# Patient Record
Sex: FEMALE | Race: BLACK OR AFRICAN AMERICAN | NOT HISPANIC OR LATINO | Employment: OTHER | URBAN - METROPOLITAN AREA
[De-identification: names, ages, dates, MRNs, and addresses within clinical notes are randomized per-mention and may not be internally consistent; named-entity substitution may affect disease eponyms.]

---

## 2017-02-01 ENCOUNTER — ALLSCRIPTS OFFICE VISIT (OUTPATIENT)
Dept: OTHER | Facility: OTHER | Age: 67
End: 2017-02-01

## 2017-06-12 ENCOUNTER — ALLSCRIPTS OFFICE VISIT (OUTPATIENT)
Dept: OTHER | Facility: OTHER | Age: 67
End: 2017-06-12

## 2017-06-12 DIAGNOSIS — R07.89 OTHER CHEST PAIN: ICD-10-CM

## 2017-06-12 DIAGNOSIS — I10 ESSENTIAL (PRIMARY) HYPERTENSION: ICD-10-CM

## 2017-06-12 DIAGNOSIS — E55.9 VITAMIN D DEFICIENCY: ICD-10-CM

## 2017-06-21 ENCOUNTER — GENERIC CONVERSION - ENCOUNTER (OUTPATIENT)
Dept: OTHER | Facility: OTHER | Age: 67
End: 2017-06-21

## 2017-06-21 ENCOUNTER — APPOINTMENT (OUTPATIENT)
Dept: LAB | Facility: CLINIC | Age: 67
End: 2017-06-21
Payer: MEDICARE

## 2017-06-21 PROCEDURE — 80061 LIPID PANEL: CPT

## 2017-06-21 PROCEDURE — 36415 COLL VENOUS BLD VENIPUNCTURE: CPT

## 2017-06-21 PROCEDURE — 84443 ASSAY THYROID STIM HORMONE: CPT

## 2017-06-21 PROCEDURE — 85025 COMPLETE CBC W/AUTO DIFF WBC: CPT

## 2017-06-21 PROCEDURE — 82306 VITAMIN D 25 HYDROXY: CPT

## 2017-06-21 PROCEDURE — 80053 COMPREHEN METABOLIC PANEL: CPT

## 2017-07-06 ENCOUNTER — HOSPITAL ENCOUNTER (OUTPATIENT)
Dept: NON INVASIVE DIAGNOSTICS | Facility: CLINIC | Age: 67
Discharge: HOME | End: 2017-07-06
Payer: MEDICARE

## 2017-07-06 PROCEDURE — A9502 TC99M TETROFOSMIN: HCPCS

## 2017-07-06 PROCEDURE — 93017 CV STRESS TEST TRACING ONLY: CPT

## 2017-07-06 PROCEDURE — 78452 HT MUSCLE IMAGE SPECT MULT: CPT

## 2017-07-06 RX ADMIN — REGADENOSON 0.4 MG: 0.08 INJECTION, SOLUTION INTRAVENOUS at 14:20

## 2017-07-07 LAB
ARRHY DURING EX: NORMAL
CHEST PAIN STATEMENT: NORMAL
MAX DIASTOLIC BP: 78 MMHG
MAX HEART RATE: 83 BPM
MAX PREDICTED HEART RATE: 152 BPM
MAX. SYSTOLIC BP: 138 MMHG
PROTOCOL NAME: NORMAL
REASON FOR TERMINATION: NORMAL
TARGET HR FORMULA: NORMAL
TEST INDICATION: NORMAL
TIME IN EXERCISE PHASE: 180 S

## 2017-07-25 ENCOUNTER — ALLSCRIPTS OFFICE VISIT (OUTPATIENT)
Dept: OTHER | Facility: OTHER | Age: 67
End: 2017-07-25

## 2017-07-26 ENCOUNTER — GENERIC CONVERSION - ENCOUNTER (OUTPATIENT)
Dept: OTHER | Facility: OTHER | Age: 67
End: 2017-07-26

## 2017-07-27 RX ORDER — MAG HYDROX/ALUMINUM HYD/SIMETH 400-400-40
SUSPENSION, ORAL (FINAL DOSE FORM) ORAL
COMMUNITY

## 2017-07-27 RX ORDER — LOSARTAN POTASSIUM AND HYDROCHLOROTHIAZIDE 25; 100 MG/1; MG/1
1 TABLET ORAL DAILY
COMMUNITY
End: 2018-10-30 | Stop reason: SDUPTHER

## 2017-07-31 ENCOUNTER — ANESTHESIA EVENT (OUTPATIENT)
Dept: PERIOP | Facility: HOSPITAL | Age: 67
End: 2017-07-31
Payer: MEDICARE

## 2017-08-01 ENCOUNTER — HOSPITAL ENCOUNTER (OUTPATIENT)
Facility: HOSPITAL | Age: 67
Setting detail: OUTPATIENT SURGERY
Discharge: HOME | End: 2017-08-01
Attending: INTERNAL MEDICINE | Admitting: INTERNAL MEDICINE
Payer: MEDICARE

## 2017-08-01 ENCOUNTER — ANESTHESIA (OUTPATIENT)
Dept: PERIOP | Facility: HOSPITAL | Age: 67
End: 2017-08-01
Payer: MEDICARE

## 2017-08-01 VITALS
TEMPERATURE: 97.8 F | OXYGEN SATURATION: 98 % | DIASTOLIC BLOOD PRESSURE: 61 MMHG | SYSTOLIC BLOOD PRESSURE: 128 MMHG | HEART RATE: 56 BPM | RESPIRATION RATE: 20 BRPM | BODY MASS INDEX: 44.34 KG/M2 | WEIGHT: 250.25 LBS | HEIGHT: 63 IN

## 2017-08-01 RX ORDER — SODIUM CHLORIDE, SODIUM LACTATE, POTASSIUM CHLORIDE, CALCIUM CHLORIDE 600; 310; 30; 20 MG/100ML; MG/100ML; MG/100ML; MG/100ML
125 INJECTION, SOLUTION INTRAVENOUS CONTINUOUS
Status: DISCONTINUED | OUTPATIENT
Start: 2017-08-01 | End: 2017-08-01 | Stop reason: HOSPADM

## 2017-08-01 RX ORDER — PROPOFOL 10 MG/ML
INJECTION, EMULSION INTRAVENOUS AS NEEDED
Status: DISCONTINUED | OUTPATIENT
Start: 2017-08-01 | End: 2017-08-01 | Stop reason: SURG

## 2017-08-01 RX ADMIN — PROPOFOL 30 MG: 10 INJECTION, EMULSION INTRAVENOUS at 09:37

## 2017-08-01 RX ADMIN — SODIUM CHLORIDE, SODIUM LACTATE, POTASSIUM CHLORIDE, AND CALCIUM CHLORIDE: .6; .31; .03; .02 INJECTION, SOLUTION INTRAVENOUS at 09:13

## 2017-08-01 RX ADMIN — PROPOFOL 90 MG: 10 INJECTION, EMULSION INTRAVENOUS at 09:31

## 2017-08-01 RX ADMIN — PROPOFOL 30 MG: 10 INJECTION, EMULSION INTRAVENOUS at 09:33

## 2017-08-01 RX ADMIN — PROPOFOL 20 MG: 10 INJECTION, EMULSION INTRAVENOUS at 09:32

## 2017-08-01 RX ADMIN — PROPOFOL 30 MG: 10 INJECTION, EMULSION INTRAVENOUS at 09:34

## 2017-10-17 ENCOUNTER — ALLSCRIPTS OFFICE VISIT (OUTPATIENT)
Dept: OTHER | Facility: OTHER | Age: 67
End: 2017-10-17

## 2017-10-18 NOTE — PROGRESS NOTES
Assessment  1  Hypertension (401 9) (I10)  2  Vitamin D deficiency (268 9) (E55 9)  3  Obesity (278 00) (E66 9)  4  Knee pain, left (739 46) (Q52 562)    Plan  Hypertension    · (1) CBC/PLT/DIFF; Status:Active; Requested for:27Gqb0253;    · (1) COMPREHENSIVE METABOLIC PANEL; Status:Active; Requested for:67Xca3217;    · (1) LIPID PANEL, FASTING; Status:Active; Requested for:14Kty6331;    · (1) TSH; Status:Active; Requested for:50Jvl5708;   Obesity    · There are many exercise options for seniors ; Status:Complete;   Done: 44AYC2315  PMH: Accelerated essential hypertension    · Renew: Losartan Potassium-HCTZ 100-25 MG Oral Tablet; TAKE 1 TABLET ONCE DAILY  Vitamin D deficiency    · (1) VITAMIN D 25-HYDROXY; Status:Active; Requested for:56Ler9919;     Discussion/Summary  Discussion Summary:   Hypertension- the blood pressure is controlled on present medications, continue the same  She was told to cut down on the sodium intakeD deficiency- she will continue to take 5000 units daily  patient was told to increase her activitywill see her back in 4 months  2010  Counseling Documentation With Imm: The patient was counseled regarding diagnostic results,-- instructions for management,-- risk factor reductions,-- risks and benefits of treatment options,-- importance of compliance with treatment  Medication SE Review and Pt Understands Tx: Possible side effects of new medications were reviewed with the patient/guardian today  The treatment plan was reviewed with the patient/guardian  The patient/guardian understands and agrees with the treatment plan      Chief Complaint  Chief Complaint Chronic Condition [facility]: Patient is here today for follow up of chronic conditions described in HPI  History of Present Illness  Obesity (Follow-Up): The patient is being seen for follow-up of obesity  The patient reports no change in the condition  She has had no significant interval events  The patient is currently asymptomatic   The patient is not currently on medication for this problem  Patellofemoral Problem (Brief): The patient is being seen for worsening symptoms of patellofemoral pain  This condition involves the left knee  Symptoms:  knee pain,-- knee swelling-- and-- stiffness in the knee  No associated symptoms are reported  Current treatment includes non-opioid analgesics  Vitamin D Deficiency: The patient is being seen for follow-up of vitamin D deficiency  Disease type: vitamin D insufficiency  Current treatment includes vitamin D3 (cholecalciferol)  The patient is currently asymptomatic  Hypertension (Follow-Up): The patient presents for follow-up of essential hypertension  The patient states she has been stable with her blood pressure control since the last visit  She has no comorbid illnesses  She has no significant interval events  Symptoms: The patient is currently asymptomatic  Medications: the patient is adherent with her medication regimen  -- the patient complains of medication side effects  Review of Systems  Complete-Female:   Constitutional: No fever, no chills, feels well, no tiredness, no recent weight gain or weight loss  Eyes: No complaints of eye pain, no red eyes, no eyesight problems, no discharge, no dry eyes, no itching of eyes  ENT: no complaints of earache, no loss of hearing, no nose bleeds, no nasal discharge, no sore throat, no hoarseness  Cardiovascular: No complaints of slow heart rate, no fast heart rate, no chest pain, no palpitations, no leg claudication, no lower extremity edema  Respiratory: No complaints of shortness of breath, no wheezing, no cough, no SOB on exertion, no orthopnea, no PND  Gastrointestinal: No complaints of abdominal pain, no constipation, no nausea or vomiting, no diarrhea, no bloody stools  Genitourinary: No complaints of dysuria, no incontinence, no pelvic pain, no dysmenorrhea, no vaginal discharge or bleeding  Musculoskeletal: as noted in HPI  Integumentary: No complaints of skin rash or lesions, no itching, no skin wounds, no breast pain or lump  Neurological: No complaints of headache, no confusion, no convulsions, no numbness, no dizziness or fainting, no tingling, no limb weakness, no difficulty walking  Psychiatric: Not suicidal, no sleep disturbance, no anxiety or depression, no change in personality, no emotional problems  Endocrine: No complaints of proptosis, no hot flashes, no muscle weakness, no deepening of the voice, no feelings of weakness  Hematologic/Lymphatic: No complaints of swollen glands, no swollen glands in the neck, does not bleed easily, does not bruise easily  Active Problems  1  Abnormal mammogram (793 80) (R92 8)  2  History of colon polyps (V12 72) (Z86 010)  3  Hypertension (401 9) (I10)  4  No advance directives (V49 89) (Z78 9)  5  Obesity (278 00) (E66 9)  6  Screening for colon cancer (V76 51) (Z12 11)  7  Screening for genitourinary condition (V81 6) (Z13 89)  8  Screening for skin condition (V82 0) (Z13 89)  9  Seborrheic keratosis (702 19) (L82 1)  10  Vitamin D deficiency (268 9) (E55 9)    Past Medical History  1  History of Abnormal blood chemistry (790 6) (R79 9)  2  History of Accelerated essential hypertension (401 0) (I10)  3  History of Allergic rhinitis (477 9) (J30 9)  4  History of Chest pressure (786 59) (R07 89)  5  History of Encounter for screening mammogram for breast cancer (V76 12) (Z12 31)  6  History of edema (V13 89) (Z87 898)  7  History of hypertension (V12 59) (Z86 79)  8  History of pneumococcal vaccination (V49 89) (Z92 29)  9  History of Joint pain, knee (719 46) (M25 569)  10  History of Myalgia And Myositis (729 1)  11  History of Need for influenza vaccination (V04 81) (Z23)  Active Problems And Past Medical History Reviewed: The active problems and past medical history were reviewed and updated today  Surgical History  1   Denied: History of Recent Surgery  Surgical History Reviewed: The surgical history was reviewed and updated today  Family History  Mother   1  No pertinent family history  Family History   2  Family history of Diabetes Mellitus (V18 0)  3  Family history of Hypertension (V17 49)  4  No pertinent family history  Family History Reviewed: The family history was reviewed and updated today  Social History   · Former smoker (N71 18) (D87 713)   · History of Former smoker (K02 12) (S36 041)   · Marital History -    · No advance directives (V49 89) (Z78 9)  Social History Reviewed: The social history was reviewed and updated today  The social history was reviewed and is unchanged  Current Meds  1  Losartan Potassium-HCTZ 100-25 MG Oral Tablet; TAKE 1 TABLET ONCE DAILY; Therapy: 72LAL1949 to (Last Rx:19Oct2016)  Requested for: 26Zaa3365 Ordered  2  Vitamin D3 61391 UNIT Oral Capsule; TAKE 1 CAPSULE Weekly; Therapy: 67Avy0843 to (Last Rx:61Ltl9118)  Requested for: 88Cvs8598 Ordered  Medication List Reviewed: The medication list was reviewed and updated today  Allergies  1  No Known Drug Allergies  2  Seasonal    Vitals  Vital Signs    Recorded: 65IWB9851 01:12PM Recorded: 88EUB8744 01:00PM   Heart Rate  62   Respiration  14   Systolic 361 983   Diastolic 80 80   Height  5 ft 5 in   Weight  255 lb 2 oz   BMI Calculated  42 46   BSA Calculated  2 19     Physical Exam    Constitutional   General appearance: Abnormal   obese  Head and Face   Head and face: Normal     Palpation of the face and sinuses: No sinus tenderness  Eyes   Conjunctiva and lids: No swelling, erythema or discharge  Pupils and irises: Equal, round, reactive to light  Ears, Nose, Mouth, and Throat   External inspection of ears and nose: Normal     Otoscopic examination: Tympanic membranes translucent with normal light reflex  Canals patent without erythema  Oropharynx: Normal with no erythema, edema, exudate or lesions      Neck   Neck: Supple, symmetric, trachea midline, no masses  Thyroid: Normal, no thyromegaly  Pulmonary   Respiratory effort: No increased work of breathing or signs of respiratory distress  Auscultation of lungs: Clear to auscultation  Cardiovascular   Palpation of heart: Normal PMI, no thrills  Auscultation of heart: Normal rate and rhythm, normal S1 and S2, no murmurs  Examination of extremities for edema and/or varicosities: Normal     Abdomen   Abdomen: Non-tender, no masses  Lymphatic   Palpation of lymph nodes in neck: No lymphadenopathy  Musculoskeletal   Gait and station: Normal     Skin   Skin and subcutaneous tissue: Normal without rashes or lesions  Neurologic   Cranial nerves: Cranial nerves II-XII intact  Cortical function: Normal mental status  Reflexes: 2+ and symmetric  Sensation: No sensory loss  Psychiatric   Judgment and insight: Normal     Mood and affect: Normal        Results/Data  Falls Risk Assessment (Dx Z13 89 Screen for Neurologic Disorder) 22FOY9768 12:58PM User, [employer]     Test Name Result Flag Reference   Falls Risk      No falls in the past year     PHQ-2 Adult Depression Screening 71RSB0828 12:57PM User, [facility]     Test Name Result Flag Reference   PHQ-2 Adult Depression Score 0     Over the last two weeks, how often have you been bothered by any of the following problems? Little interest or pleasure in doing things: Not at all - 0  Feeling down, depressed, or hopeless: Not at all - 0   PHQ-2 Adult Depression Screening Negative         Future Appointments    Date/Time Provider Specialty Site   02/07/2018 02:05 PM RIRI Graham   Dermatology [facility]     Signatures   Electronically signed by : RIRI Oropeza ; Oct 17 2017  2:49PM EST                       (Author)    Electronically signed by : RIRI Oropeza ; Oct 17 2017  2:50PM EST                       (Author)

## 2017-12-17 DIAGNOSIS — E55.9 VITAMIN D DEFICIENCY: ICD-10-CM

## 2017-12-17 DIAGNOSIS — I10 ESSENTIAL (PRIMARY) HYPERTENSION: ICD-10-CM

## 2017-12-22 ENCOUNTER — APPOINTMENT (OUTPATIENT)
Dept: LAB | Facility: CLINIC | Age: 67
End: 2017-12-22
Payer: MEDICARE

## 2017-12-22 PROCEDURE — 36415 COLL VENOUS BLD VENIPUNCTURE: CPT

## 2017-12-22 PROCEDURE — 85025 COMPLETE CBC W/AUTO DIFF WBC: CPT

## 2017-12-22 PROCEDURE — 82306 VITAMIN D 25 HYDROXY: CPT

## 2017-12-22 PROCEDURE — 84443 ASSAY THYROID STIM HORMONE: CPT

## 2017-12-22 PROCEDURE — 80061 LIPID PANEL: CPT

## 2017-12-22 PROCEDURE — 80053 COMPREHEN METABOLIC PANEL: CPT

## 2017-12-27 ENCOUNTER — GENERIC CONVERSION - ENCOUNTER (OUTPATIENT)
Dept: INTERNAL MEDICINE CLINIC | Facility: CLINIC | Age: 67
End: 2017-12-27

## 2018-01-12 NOTE — PROGRESS NOTES
Chief Complaint  CC: NEW PATIENT, FULL BODY EXAM, PATIENT CONCERNED WITH MOLES ON FACE AND BACK  History of Present Illness  78-year-old female presents for overall skin check concern regarding numerous growths or skin no specific concerns noted      Assessment  Assessed    1  Seborrheic keratosis (702 19) (L82 1)   2  Screening for skin condition (V82 0) (Z13 89)    Active Problems  Problems    1  Abnormal mammogram (793 80) (R92 8)   2  Encounter for screening mammogram for breast cancer (V76 12) (Z12 31)   3  Hypertension (401 9) (I10)   4  Need for influenza vaccination (V04 81) (Z23)   5  Obesity (278 00) (E66 9)   6  Vitamin D deficiency (268 9) (E55 9)    Past Medical History  Problems    1  History of Abnormal blood chemistry (790 6) (R79 9)   2  History of Accelerated essential hypertension (401 0) (I10)   3  History of Allergic rhinitis (477 9) (J30 9)   4  History of edema (V13 89) (Z87 898)   5  History of hypertension (V12 59) (Z86 79)   6  History of Joint pain, knee (719 46) (M25 569)   7  History of Myalgia And Myositis (729 1)    The past medical history was reviewed  Surgical History  Problems    1  Denied: History of Recent Surgery    Surgical History reviewed      Family History  Family History    1  Family history of Diabetes Mellitus (V18 0)   2  Family history of Hypertension (V17 49)   3  No pertinent family history    Family History was reviewed      Social History  Problems    · Former smoker (V15 82) (M16 937)   · Marital History -   The social history was reviewed      Current Meds   1  Losartan Potassium-HCTZ 100-25 MG Oral Tablet; TAKE 1 TABLET ONCE DAILY; Therapy: 27DNT3486 to (Renew: 26LIR3591)  Requested for: 32DXX6752; Last   Rx:22Oct2015 Ordered   2  Vitamin D3 99276 UNIT Oral Capsule; TAKE 1 CAPSULE Weekly; Therapy: 28Apr2014 to (Last Rx:28Apr2014)  Requested for: 28Apr2014 Ordered    Review of Systems    Constitutional: Denies constitutional symptoms  Eyes: Denies eye symptoms  ENT:  denies ear symptoms, nasal symptoms, mouth or throat symptoms  Cardiovascular: Denies cardiovascular symptoms  Respiratory: Denies respiratory symptoms  Gastrointestinal: Denies gastrointestinal symptoms  Musculoskeletal: Denies musculoskeletal symptoms  Integumentary: Denies symptoms other than stated above  Neurological: Denies neurologic symptoms  Psychiatric: Denies psychiatric symptoms  Endocrine: Denies endocrine symptoms  Hematologic/Lymphatic: Denies hematologic symptoms  Allergies  Medication    1  No Known Drug Allergies    Physical Exam    Constitutional   General appearance: Appears healthy and well developed  Lymphatic   No visible disturbance  Musculoskeletal   Digits and nails: No clubbing, cyanosis or edema  Cutaneous and nail exam normal     Skin   Scalp skin texture and hair distribution: Normal skin texture on scalp, normal hair distribution  Head: Normal turgor, no rashes, no lesions  Neck: Normal turgor, no rashes, no lesions  Chest: Normal turgor, no rashes, no lesions  Abdomen: Normal turgor, no rashes, no lesions  Back: Normal turgor, no rashes, no lesions  Right upper extremity: Normal turgor, no rashes, no lesions  Left upper extremity: Normal turgor, no rashes, no lesions  Right lower extremity: Normal turgor, no rashes, no lesions  Left lower extremity: Normal turgor, no rashes, no lesions  Neuro/Psych   Alert and oriented x 3  Displays comfort and cooperation during encounterl   Affect is normal     Finding Normal keratotic papules with greasy stuck on appearance numerous pigmented lesions all with regular shape and color nothing markedly atypical noted on complete exam       Plan  Screening for skin condition    · Follow-up visit in 1 year Evaluation and Treatment  Follow-up  Status: Hold For -  Scheduling  Requested for: 13FCB5077    Discussion/Summary    Seborrheic keratosis patient reassured these are normal growths acquire with age no treatment needed nothing else of concern noted on complete exam followup in one year patient reassured that people with darker  skin color have much lower risk of skin cancer than Caucasians        Signatures   Electronically signed by : Gwenette Gosselin, M D ; Jan 27 2016  2:08PM EST                       (Author)

## 2018-01-12 NOTE — RESULT NOTES
Verified Results  (1) CBC/PLT/DIFF 21Jun2017 11:21AM Gabriel Small    Order Number: [id number]     Test Name Result Flag Reference   WBC COUNT 4 35 Thousand/uL  4 31-10 16   RBC COUNT 4 98 Million/uL  3 81-5 12   HEMOGLOBIN 13 0 g/dL  11 5-15 4   HEMATOCRIT 40 3 %  34 8-46  1   MCV 81 fL L 82-98   MCH 26 1 pg L 26 8-34 3   MCHC 32 3 g/dL  31 4-37 4   RDW 13 9 %  11 6-15 1   MPV 11 3 fL  8 9-12 7   PLATELET COUNT 051 Thousands/uL  149-390   nRBC AUTOMATED 0 /100 WBCs     NEUTROPHILS RELATIVE PERCENT 52 %  43-75   LYMPHOCYTES RELATIVE PERCENT 37 %  14-44   MONOCYTES RELATIVE PERCENT 7 %  4-12   EOSINOPHILS RELATIVE PERCENT 3 %  0-6   BASOPHILS RELATIVE PERCENT 1 %  0-1   NEUTROPHILS ABSOLUTE COUNT 2 28 Thousands/? ??L  1 85-7 62   LYMPHOCYTES ABSOLUTE COUNT 1 61 Thousands/? ??L  0 60-4 47   MONOCYTES ABSOLUTE COUNT 0 29 Thousand/? ??L  0 17-1 22   EOSINOPHILS ABSOLUTE COUNT 0 14 Thousand/? ??L  0 00-0 61   BASOPHILS ABSOLUTE COUNT 0 02 Thousands/? ??L  0 00-0 10     (1) COMPREHENSIVE METABOLIC PANEL 18KRS4597 39:90ZW Sandy Ocampo Order Number: [id number]     Test Name Result Flag Reference   SODIUM 140 mmol/L  136-145   POTASSIUM 3 7 mmol/L  3 5-5 3   CHLORIDE 104 mmol/L  100-108   CARBON DIOXIDE 31 mmol/L  21-32   ANION GAP (CALC) 5 mmol/L  4-13   BLOOD UREA NITROGEN 14 mg/dL  5-25   CREATININE 0 87 mg/dL  0 60-1 30   Standardized to IDMS reference method   CALCIUM 9 3 mg/dL  8 3-10 1   BILI, TOTAL 0 54 mg/dL  0 20-1 00   ALK PHOSPHATAS 92 U/L     ALT (SGPT) 18 U/L  12-78   AST(SGOT) 14 U/L  5-45   ALBUMIN 3 6 g/dL  3 5-5 0   TOTAL PROTEIN 7 2 g/dL  6 4-8 2   eGFR Non-African American      >60 0 ml/min/1 73sq m   [facility] Disease Education Program recommendations are as follows:  GFR calculation is accurate only with a steady state creatinine  Chronic Kidney disease less than 60 ml/min/1 73 sq  meters  Kidney failure less than 15 ml/min/1 73 sq  meters     GLUCOSE FASTING 100 mg/dL H 65-99     (1) LIPID PANEL, FASTING 21Jun2017 11:21AM Jannifer Dakins Order Number: [id number]     Test Name Result Flag Reference   CHOLESTEROL 208 mg/dL H    HDL,DIRECT 66 mg/dL H 40-60   Specimen collection should occur prior to Metamizole administration due to the potential for falsely depressed results  LDL CHOLESTEROL CALCULATED 126 mg/dL H 0-100   Triglyceride:         Normal              <150 mg/dl       Borderline High    150-199 mg/dl       High               200-499 mg/dl       Very High          >499 mg/dl  Cholesterol:         Desirable        <200 mg/dl      Borderline High  200-239 mg/dl      High             >239 mg/dl  HDL Cholesterol:        High    >59 mg/dL      Low     <41 mg/dL  LDL CALCULATED:    This screening LDL is a calculated result  It does not have the accuracy of the Direct Measured LDL in the monitoring of patients with hyperlipidemia and/or statin therapy  Direct Measure LDL (HOU128) must be ordered separately in these patients  TRIGLYCERIDES 82 mg/dL  <=150   Specimen collection should occur prior to N-Acetylcysteine or Metamizole administration due to the potential for falsely depressed results  (1) TSH 21Jun2017 11:21AM Selina Harris    Order Number: [id number]     Test Name Result Flag Reference   TSH 1 030 uIU/mL  0 358-3 740   Patients undergoing fluorescein dye angiography may retain small amounts of fluorescein in the body for 48-72 hours post procedure  Samples containing fluorescein can produce falsely depressed TSH values  If the patient had this procedure,a specimen should be resubmitted post fluorescein clearance            The recommended reference ranges for TSH during pregnancy are as follows:  First trimester 0 1 to 2 5 uIU/mL  Second trimester  0 2 to 3 0 uIU/mL  Third trimester 0 3 to 3 0 uIU/m     (1) VITAMIN D 25-HYDROXY 21Jun2017 11:21AM Jannifer Dakins Order Number: [id number]     Test Name Result Flag Reference   VIT D 25-HYDROX 30 4 ng/mL  30 0-100 0   This assay is a certified procedure of the CDC Vitamin D Standardization Certification Program (VDSCP)     Deficiency <20ng/ml   Insufficiency 20-30ng/ml   Sufficient  ng/ml     *Patients undergoing fluorescein dye angiography may retain small amounts of fluorescein in the body for 48-72 hours post procedure  Samples containing fluorescein can produce falsely elevated Vitamin D values  If the patient had this procedure, a specimen should be resubmitted post fluorescein clearance

## 2018-01-13 VITALS
HEIGHT: 65 IN | RESPIRATION RATE: 14 BRPM | SYSTOLIC BLOOD PRESSURE: 148 MMHG | WEIGHT: 255.13 LBS | HEART RATE: 62 BPM | DIASTOLIC BLOOD PRESSURE: 80 MMHG | BODY MASS INDEX: 42.51 KG/M2

## 2018-01-14 VITALS
HEART RATE: 60 BPM | DIASTOLIC BLOOD PRESSURE: 84 MMHG | BODY MASS INDEX: 42.34 KG/M2 | WEIGHT: 254.13 LBS | SYSTOLIC BLOOD PRESSURE: 132 MMHG | HEIGHT: 65 IN

## 2018-01-15 VITALS
HEART RATE: 60 BPM | HEIGHT: 65 IN | WEIGHT: 257 LBS | DIASTOLIC BLOOD PRESSURE: 80 MMHG | BODY MASS INDEX: 42.82 KG/M2 | SYSTOLIC BLOOD PRESSURE: 140 MMHG

## 2018-04-23 RX ORDER — POTASSIUM CHLORIDE 20 MEQ/1
1 TABLET, EXTENDED RELEASE ORAL DAILY
COMMUNITY
Start: 2017-12-22 | End: 2019-05-14 | Stop reason: SDUPTHER

## 2018-04-24 ENCOUNTER — OFFICE VISIT (OUTPATIENT)
Dept: INTERNAL MEDICINE CLINIC | Facility: CLINIC | Age: 68
End: 2018-04-24
Payer: MEDICARE

## 2018-04-24 VITALS
DIASTOLIC BLOOD PRESSURE: 82 MMHG | OXYGEN SATURATION: 99 % | HEIGHT: 63 IN | SYSTOLIC BLOOD PRESSURE: 140 MMHG | HEART RATE: 79 BPM | RESPIRATION RATE: 16 BRPM | WEIGHT: 250 LBS | BODY MASS INDEX: 44.3 KG/M2

## 2018-04-24 DIAGNOSIS — I10 ESSENTIAL HYPERTENSION: ICD-10-CM

## 2018-04-24 DIAGNOSIS — M25.551 RIGHT HIP PAIN: Primary | ICD-10-CM

## 2018-04-24 DIAGNOSIS — E66.01 MORBID OBESITY (HCC): ICD-10-CM

## 2018-04-24 DIAGNOSIS — E55.9 VITAMIN D DEFICIENCY: ICD-10-CM

## 2018-04-24 PROBLEM — E87.6 HYPOKALEMIA: Status: ACTIVE | Noted: 2017-12-22

## 2018-04-24 PROBLEM — M25.562 KNEE PAIN, LEFT: Status: ACTIVE | Noted: 2017-10-17

## 2018-04-24 PROCEDURE — 99214 OFFICE O/P EST MOD 30 MIN: CPT | Performed by: INTERNAL MEDICINE

## 2018-04-24 RX ORDER — NAPROXEN 500 MG/1
500 TABLET ORAL 2 TIMES DAILY WITH MEALS
Qty: 20 TABLET | Refills: 0 | Status: SHIPPED | OUTPATIENT
Start: 2018-04-24 | End: 2018-10-30

## 2018-04-24 NOTE — PROGRESS NOTES
INTERNAL MEDICINE OFFICE VISIT  [facility] AT [address] [address]  Tel: [phone]      NAME: Eden Jeffery  AGE: 70 y o  SEX: female  : 1950   MRN: [id number]    DATE: 2018  TIME: 4:55 PM      Assessment and Plan:  1  Right hip pain  Has severe pain in the right hip but does not want to see the orthopedic surgeon right now  Will get back to her with the results of the hip x-ray and she was told to take naproxen for 10 days  He will also apply heat to the hip     - naproxen (NAPROSYN) 500 mg tablet; Take 1 tablet (500 mg total) by mouth 2 (two) times a day with meals  Dispense: 20 tablet; Refill: 0  - XR hip/pelv 2-3 vws right if performed; Future    2  Essential hypertension  Well controlled, continue same medications  - CBC and differential; Future  - Comprehensive metabolic panel; Future  - Lipid panel; Future  - TSH, 3rd generation; Future    3  Vitamin D deficiency  Continue vitamin-D supplement  4  Morbid obesity ([address] )  She was told to cut down on the calories and increase her activity      - Counseling Documentation: patient was counseled regarding: instructions for management, risk factor reductions, prognosis, patient and family education, impressions, risks and benefits of treatment options and importance of compliance with treatment  - Medication Side Effects: Adverse side effects of medications were reviewed with the patient/guardian today  Return for follow up visit in as needed or earlier, if needed  Chief Complaint:  Chief Complaint   Patient presents with    Well Check     6 month         History of Present Illness:   Right hip pain-for the last few weeks the pain in her right hip is increasing gradually  She is unable to sleep at night and unable to put weight on the hip  Hypertension-takes losartan and hydrochlorothiazide regularly    Vitamin-D deficiency-takes vitamin-D supplement regularly  Morbid obesity-she has been trying to exercise and watch her diet but is not losing much weight  Active Problem List:  Patient Active Problem List   Diagnosis    Abnormal mammogram    Essential hypertension    Hypokalemia    Knee pain, left    Seborrheic keratosis    Vitamin D deficiency    Right hip pain    Morbid obesity ([address] )         Past Medical History:  Past Medical History:   Diagnosis Date    Abnormal blood chemistry     Accelerated essential hypertension     Allergic rhinitis     Edema     Hypertension     Hypertension     Myalgia     Myositis          Past Surgical History:  Past Surgical History:   Procedure Laterality Date    HYSTERECTOMY      HI COLONOSCOPY FLX DX W/COLLJ SPEC WHEN PFRMD N/A 8/1/2017    Procedure: COLONOSCOPY;  Surgeon: Rex Summers MD;  Location: [facility]; Service: Gastroenterology         Family History:  Family History   Problem Relation Age of Onset    No Known Problems Mother     Diabetes Family     Hypertension Family          Social History:  Social History     Social History    Marital status:       Spouse name: N/A    Number of children: N/A    Years of education: N/A     Social History Main Topics    Smoking status: Former Smoker     Quit date: 1995    Smokeless tobacco: Never Used    Alcohol use Yes      Comment: occ    Drug use: No    Sexual activity: Not Asked     Other Topics Concern    None     Social History Narrative    No advance directives             Allergies:  No Known Allergies      Medications:    Current Outpatient Prescriptions:     Cholecalciferol (VITAMIN D3) 5000 units CAPS, Take by mouth, Disp: , Rfl:     losartan-hydrochlorothiazide (HYZAAR) 100-25 MG per tablet, Take 1 tablet by mouth daily, Disp: , Rfl:     potassium chloride (K-DUR,KLOR-CON) 20 mEq tablet, Take 1 tablet by mouth daily, Disp: , Rfl:     naproxen (NAPROSYN) 500 mg tablet, Take 1 tablet (500 mg total) by mouth 2 (two) times a day with meals, Disp: 20 tablet, Rfl: 0      The following portions of the patient's history were reviewed and updated as appropriate: past medical history, past surgical history, family history, social history, allergies, current medications and active problem list       Review of Systems:  Constitutional: Denies fever, chills, weight gain, weight loss, fatigue  Eyes: Denies eye redness, eye discharge, double vision, change in visual acuity  ENT: Denies hearing loss, tinnitus, sneezing, nasal congestion, nasal discharge, sore throat   Respiratory: Denies cough, expectoration, hemoptysis, shortness of breath, wheezing  Cardiovascular: Denies chest pain, palpitations, lower extremity swelling, orthopnea, PND  Gastrointestinal: Denies abdominal pain, heartburn, nausea, vomiting, hematemesis, diarrhea, bloody stools  Genito-Urinary: Denies dysuria, frequency, difficulty in micturition, nocturia, incontinence  Musculoskeletal: Denies back pain, complains of pain in the right hip  Neurologic: Denies confusion, lightheadedness, syncope, headache, focal weakness, sensory changes, seizures  Endocrine: Denies polyuria, polydipsia, temperature intolerance  Allergy and Immunology: Denies hives, insect bite sensitivity  Hematological and Lymphatic: Denies bleeding problems, swollen glands   Psychological: Denies depression, suicidal ideation, anxiety, panic, mood swings  Dermatological: Denies pruritus, rash, skin lesion changes      Vitals:  Vitals:    04/24/18 1633   BP: 140/82   Pulse: 79   Resp: 16   SpO2: 99%       Body mass index is 44 29 kg/m²  Weight (last 2 days)     Date/Time   Weight    04/24/18 1633  113 (250)                Physical Examination:  General: Patient is not in acute distress  Awake, alert, responding to commands  No weight gain or loss  Head: Normocephalic  Atraumatic  Eyes: Conjunctiva and lids with no swelling, erythema or discharge  Both pupils normal sized, round and reactive to light   Sclera nonicteric  ENT: External examination of nose and ear normal  Otoscopic examination shows translucent tympanic membranes with patent canals without erythema  Oropharynx moist with no erythema, edema, exudate or lesions  Neck: Supple  JVP not raised  Trachea midline  No masses  No thyromegaly  Lungs: No signs of increased work of breathing or respiratory distress  Bilateral bronchovascular breath sounds with no crackles or rhonchi  Chest wall: No tenderness  Cardiovascular: Normal PMI  No thrills  Regular rate and rhythm  S1 and S2 normal  No murmur, rub or gallop  Gastrointestinal: Abdomen soft, nontender  No guarding or rigidity  Liver and spleen not palpable  Bowel sounds present  Neurologic: Cranial nerves II-XII intact  Cortical functions normal  Motor system - Reflexes 2+ and symmetrical  Sensations normal  Musculoskeletal:  Has tenderness in the right hip and right groin    Integumentary: Skin normal with no rash or lesions  Lymphatic: No palpable lymph nodes in neck, axilla or groin  Extremities: No clubbing, cyanosis, edema or varicosities  Psychological: Judgement and insight normal  Mood and affect normal      Laboratory Results:  CBC with diff:   Lab Results   Component Value Date    WBC 3 93 (L) 12/22/2017    WBC 4 27 (L) 09/30/2015    RBC 4 97 12/22/2017    RBC 4 98 09/30/2015    HGB 12 9 12/22/2017    HGB 12 9 09/30/2015    HCT 40 0 12/22/2017    HCT 40 1 09/30/2015    MCV 81 (L) 12/22/2017    MCV 81 (L) 09/30/2015    MCH 26 0 (L) 12/22/2017    MCH 25 9 (L) 09/30/2015    RDW 13 9 12/22/2017    RDW 14 3 09/30/2015     12/22/2017     09/30/2015       CMP:  Lab Results   Component Value Date    CREATININE 0 79 12/22/2017    CREATININE 0 88 09/30/2015    BUN 11 12/22/2017    BUN 14 09/30/2015     12/22/2017     09/30/2015    K 3 1 (L) 12/22/2017    K 3 9 09/30/2015    CL 99 (L) 12/22/2017     09/30/2015    CO2 29 12/22/2017    CO2 31 09/30/2015    GLUCOSE 90 12/26/2016 GLUCOSE 93 09/30/2015    PROT 7 6 12/22/2017    PROT 7 7 09/30/2015    ALKPHOS 88 12/22/2017    ALKPHOS 95 09/30/2015    ALT 22 12/22/2017    ALT 22 09/30/2015    AST 14 12/22/2017    AST 13 09/30/2015       Lab Results   Component Value Date    HGBA1C 4 7 04/28/2014       No results found for: TROPONINI, CKMB, CKTOTAL    Lipid Profile:   Lab Results   Component Value Date    CHOL 203 (H) 12/22/2017    CHOL 208 (H) 06/21/2017     Lab Results   Component Value Date    HDL 71 (H) 12/22/2017    HDL 66 (H) 06/21/2017     Lab Results   Component Value Date    LDLCALC 117 (H) 12/22/2017    LDLCALC 126 (H) 06/21/2017     Lab Results   Component Value Date    TRIG 76 12/22/2017    TRIG 82 06/21/2017         Health Maintenance:  Health Maintenance   Topic Date Due    Hepatitis C Screening  1950    GLAUCOMA SCREENING 67+ YR  04/14/2016    Fall Risk  04/24/2019    Depression Screening PHQ-9  04/24/2019    Urinary Incontinence Screening  04/24/2019    DTaP,Tdap,and Td Vaccines (2 - Td) 11/17/2024    COLONOSCOPY  08/01/2027    INFLUENZA VACCINE  Completed    PNEUMOCOCCAL POLYSACCHARIDE VACCINE AGE 65 AND OVER  Completed     Immunization History   Administered Date(s) Administered    Influenza Split High Dose Preservative Free IM 10/17/2017    Pneumococcal Conjugate 13-Valent 10/22/2015    Pneumococcal Polysaccharide PPV23 12/10/2016    Tdap 11/17/2014         Kierra Goss MD  6/56/6629,2:20 PM

## 2018-04-30 ENCOUNTER — LAB (OUTPATIENT)
Dept: LAB | Facility: CLINIC | Age: 68
End: 2018-04-30
Payer: MEDICARE

## 2018-04-30 DIAGNOSIS — I10 ESSENTIAL HYPERTENSION: ICD-10-CM

## 2018-04-30 LAB
ALBUMIN SERPL BCP-MCNC: 3.9 G/DL (ref 3.5–5)
ALP SERPL-CCNC: 92 U/L (ref 46–116)
ALT SERPL W P-5'-P-CCNC: 16 U/L (ref 12–78)
ANION GAP SERPL CALCULATED.3IONS-SCNC: 6 MMOL/L (ref 4–13)
AST SERPL W P-5'-P-CCNC: 15 U/L (ref 5–45)
BASOPHILS # BLD AUTO: 0.03 THOUSANDS/ΜL (ref 0–0.1)
BASOPHILS NFR BLD AUTO: 1 % (ref 0–1)
BILIRUB SERPL-MCNC: 0.64 MG/DL (ref 0.2–1)
BUN SERPL-MCNC: 17 MG/DL (ref 5–25)
CALCIUM SERPL-MCNC: 9.2 MG/DL (ref 8.3–10.1)
CHLORIDE SERPL-SCNC: 104 MMOL/L (ref 100–108)
CHOLEST SERPL-MCNC: 190 MG/DL (ref 50–200)
CO2 SERPL-SCNC: 29 MMOL/L (ref 21–32)
CREAT SERPL-MCNC: 0.83 MG/DL (ref 0.6–1.3)
EOSINOPHIL # BLD AUTO: 0.13 THOUSAND/ΜL (ref 0–0.61)
EOSINOPHIL NFR BLD AUTO: 3 % (ref 0–6)
ERYTHROCYTE [DISTWIDTH] IN BLOOD BY AUTOMATED COUNT: 13.8 % (ref 11.6–15.1)
GFR SERPL CREATININE-BSD FRML MDRD: 83 ML/MIN/1.73SQ M
GLUCOSE P FAST SERPL-MCNC: 86 MG/DL (ref 65–99)
HCT VFR BLD AUTO: 39.6 % (ref 34.8–46.1)
HDLC SERPL-MCNC: 67 MG/DL (ref 40–60)
HGB BLD-MCNC: 12.8 G/DL (ref 11.5–15.4)
LDLC SERPL CALC-MCNC: 105 MG/DL (ref 0–100)
LYMPHOCYTES # BLD AUTO: 1.42 THOUSANDS/ΜL (ref 0.6–4.47)
LYMPHOCYTES NFR BLD AUTO: 32 % (ref 14–44)
MCH RBC QN AUTO: 26.3 PG (ref 26.8–34.3)
MCHC RBC AUTO-ENTMCNC: 32.3 G/DL (ref 31.4–37.4)
MCV RBC AUTO: 81 FL (ref 82–98)
MONOCYTES # BLD AUTO: 0.18 THOUSAND/ΜL (ref 0.17–1.22)
MONOCYTES NFR BLD AUTO: 4 % (ref 4–12)
NEUTROPHILS # BLD AUTO: 2.63 THOUSANDS/ΜL (ref 1.85–7.62)
NEUTS SEG NFR BLD AUTO: 60 % (ref 43–75)
NONHDLC SERPL-MCNC: 123 MG/DL
NRBC BLD AUTO-RTO: 0 /100 WBCS
PLATELET # BLD AUTO: 248 THOUSANDS/UL (ref 149–390)
PMV BLD AUTO: 11.3 FL (ref 8.9–12.7)
POTASSIUM SERPL-SCNC: 3.7 MMOL/L (ref 3.5–5.3)
PROT SERPL-MCNC: 7.7 G/DL (ref 6.4–8.2)
RBC # BLD AUTO: 4.87 MILLION/UL (ref 3.81–5.12)
SODIUM SERPL-SCNC: 139 MMOL/L (ref 136–145)
TRIGL SERPL-MCNC: 90 MG/DL
TSH SERPL DL<=0.05 MIU/L-ACNC: 0.9 UIU/ML (ref 0.36–3.74)
WBC # BLD AUTO: 4.39 THOUSAND/UL (ref 4.31–10.16)

## 2018-04-30 PROCEDURE — 85025 COMPLETE CBC W/AUTO DIFF WBC: CPT

## 2018-04-30 PROCEDURE — 84443 ASSAY THYROID STIM HORMONE: CPT

## 2018-04-30 PROCEDURE — 80061 LIPID PANEL: CPT

## 2018-04-30 PROCEDURE — 80053 COMPREHEN METABOLIC PANEL: CPT

## 2018-04-30 PROCEDURE — 36415 COLL VENOUS BLD VENIPUNCTURE: CPT

## 2018-05-01 ENCOUNTER — TELEPHONE (OUTPATIENT)
Dept: INTERNAL MEDICINE CLINIC | Facility: CLINIC | Age: 68
End: 2018-05-01

## 2018-05-01 NOTE — TELEPHONE ENCOUNTER
----- Message from Radha Valentino MD sent at 4/30/2018  6:06 PM EDT -----  Labs ok, please call and inform patient

## 2018-10-30 ENCOUNTER — OFFICE VISIT (OUTPATIENT)
Dept: INTERNAL MEDICINE CLINIC | Facility: CLINIC | Age: 68
End: 2018-10-30
Payer: MEDICARE

## 2018-10-30 VITALS
BODY MASS INDEX: 45.25 KG/M2 | DIASTOLIC BLOOD PRESSURE: 76 MMHG | HEIGHT: 63 IN | WEIGHT: 255.4 LBS | HEART RATE: 79 BPM | OXYGEN SATURATION: 94 % | SYSTOLIC BLOOD PRESSURE: 126 MMHG

## 2018-10-30 DIAGNOSIS — E66.01 MORBID OBESITY (HCC): ICD-10-CM

## 2018-10-30 DIAGNOSIS — M16.11 PRIMARY OSTEOARTHRITIS OF RIGHT HIP: ICD-10-CM

## 2018-10-30 DIAGNOSIS — I10 ESSENTIAL HYPERTENSION: Primary | ICD-10-CM

## 2018-10-30 DIAGNOSIS — E55.9 VITAMIN D DEFICIENCY: ICD-10-CM

## 2018-10-30 DIAGNOSIS — E87.6 HYPOKALEMIA: ICD-10-CM

## 2018-10-30 PROBLEM — M25.551 RIGHT HIP PAIN: Status: RESOLVED | Noted: 2018-04-24 | Resolved: 2018-10-30

## 2018-10-30 PROCEDURE — 99214 OFFICE O/P EST MOD 30 MIN: CPT | Performed by: INTERNAL MEDICINE

## 2018-10-30 RX ORDER — LOSARTAN POTASSIUM AND HYDROCHLOROTHIAZIDE 25; 100 MG/1; MG/1
1 TABLET ORAL DAILY
Qty: 90 TABLET | Refills: 2 | Status: SHIPPED | OUTPATIENT
Start: 2018-10-30 | End: 2019-05-14 | Stop reason: SDUPTHER

## 2018-10-30 NOTE — PROGRESS NOTES
INTERNAL MEDICINE OFFICE VISIT  [facility] AT [place] [address]  Tel: [phone]      NAME: Anamaria Shepherd  AGE: 70 y o  SEX: female  : 1950   MRN: [id number]    DATE: 10/30/2018  TIME: 3:30 PM      Assessment and Plan:  1  Essential hypertension  Continue present medications  - losartan-hydrochlorothiazide (HYZAAR) 100-25 MG per tablet; Take 1 tablet by mouth daily  Dispense: 90 tablet; Refill: 2  - CBC and differential; Future  - Comprehensive metabolic panel; Future  - Lipid panel; Future  - TSH, 3rd generation; Future    2  Primary osteoarthritis of right hip  She does not want to see the orthopedic doctor yet  She wants to try physical therapy  - Ambulatory referral to Physical Therapy; Future    3  Hypokalemia  Continue potassium supplement    4  Vitamin D deficiency  Continue vitamin-D  - Vitamin D 25 hydroxy; Future    5  Morbid obesity ([facility] [address] )  She was told to try to lose weight      - Counseling Documentation: patient was counseled regarding: diagnostic results, instructions for management, risk factor reductions, prognosis, patient and family education, impressions, risks and benefits of treatment options and importance of compliance with treatment  - Medication Side Effects: Adverse side effects of medications were reviewed with the patient/guardian today  Return for follow up visit in 6 months or earlier, if needed  Chief Complaint:  Chief Complaint   Patient presents with    Well Check     6 month         History of Present Illness:   Hypertension-blood pressure stable on the present medications  Right hip pain-she has been complaining of pain in the right hip for a long time  The last time she was here an x-ray of the hip was done which showed advanced arthritis  Hypokalemia-she has been taking hydrochlorothiazide which decreases her potassium and she has to take the potassium supplement    Vitamin-D deficiency-she takes vitamin-D supplement regularly  Morbid obesity-she has not been able to lose weight at all  Active Problem List:  Patient Active Problem List   Diagnosis    Abnormal mammogram    Essential hypertension    Hypokalemia    Knee pain, left    Seborrheic keratosis    Vitamin D deficiency    Morbid obesity ([address] )    Osteoarthritis of right hip         Past Medical History:  Past Medical History:   Diagnosis Date    Abnormal blood chemistry     Accelerated essential hypertension     Allergic rhinitis     Edema     Hypertension     Hypertension     Myalgia     Myositis          Past Surgical History:  Past Surgical History:   Procedure Laterality Date    HYSTERECTOMY      MI COLONOSCOPY FLX DX W/COLLJ SPEC WHEN PFRMD N/A 8/1/2017    Procedure: COLONOSCOPY;  Surgeon: Eli Swift MD;  Location: [facility]; Service: Gastroenterology         Family History:  Family History   Problem Relation Age of Onset    No Known Problems Mother     Diabetes Family     Hypertension Family          Social History:  Social History     Social History    Marital status:       Spouse name: N/A    Number of children: N/A    Years of education: N/A     Social History Main Topics    Smoking status: Former Smoker     Quit date: 1995    Smokeless tobacco: Never Used    Alcohol use Yes      Comment: occ    Drug use: No    Sexual activity: Not Asked     Other Topics Concern    None     Social History Narrative    No advance directives             Allergies:  No Known Allergies      Medications:    Current Outpatient Prescriptions:     Cholecalciferol (VITAMIN D3) 5000 units CAPS, Take by mouth, Disp: , Rfl:     losartan-hydrochlorothiazide (HYZAAR) 100-25 MG per tablet, Take 1 tablet by mouth daily, Disp: 90 tablet, Rfl: 2    potassium chloride (K-DUR,KLOR-CON) 20 mEq tablet, Take 1 tablet by mouth daily, Disp: , Rfl:       The following portions of the patient's history were reviewed and updated as appropriate: past medical history, past surgical history, family history, social history, allergies, current medications and active problem list       Review of Systems:  Constitutional: Denies fever, chills, weight gain, weight loss, fatigue  Eyes: Denies eye redness, eye discharge, double vision, change in visual acuity  ENT: Denies hearing loss, tinnitus, sneezing, nasal congestion, nasal discharge, sore throat   Respiratory: Denies cough, expectoration, hemoptysis, shortness of breath, wheezing  Cardiovascular: Denies chest pain, palpitations, lower extremity swelling, orthopnea, PND  Gastrointestinal: Denies abdominal pain, heartburn, nausea, vomiting, hematemesis, diarrhea, bloody stools  Genito-Urinary: Denies dysuria, frequency, difficulty in micturition, nocturia, incontinence  Musculoskeletal: has right hip pain  Neurologic: Denies confusion, lightheadedness, syncope, headache, focal weakness, sensory changes, seizures  Endocrine: Denies polyuria, polydipsia, temperature intolerance  Allergy and Immunology: Denies hives, insect bite sensitivity  Hematological and Lymphatic: Denies bleeding problems, swollen glands   Psychological: Denies depression, suicidal ideation, anxiety, panic, mood swings  Dermatological: Denies pruritus, rash, skin lesion changes      Vitals:  Vitals:    10/30/18 1453   BP: 126/76   Pulse: 79   SpO2: 94%       Body mass index is 45 24 kg/m²  Weight (last 2 days)     Date/Time   Weight    10/30/18 1453  116 (255 4)                Physical Examination:  General: Patient is not in acute distress  Awake, alert, responding to commands  No weight gain or loss  Head: Normocephalic  Atraumatic  Eyes: Conjunctiva and lids with no swelling, erythema or discharge  Both pupils normal sized, round and reactive to light   Sclera nonicteric  ENT: External examination of nose and ear normal  Otoscopic examination shows translucent tympanic membranes with patent canals without erythema  Oropharynx moist with no erythema, edema, exudate or lesions  Neck: Supple  JVP not raised  Trachea midline  No masses  No thyromegaly  Lungs: No signs of increased work of breathing or respiratory distress  Bilateral bronchovascular breath sounds with no crackles or rhonchi  Chest wall: No tenderness  Cardiovascular: Normal PMI  No thrills  Regular rate and rhythm  S1 and S2 normal  No murmur, rub or gallop  Gastrointestinal: Abdomen soft, nontender  No guarding or rigidity  Liver and spleen not palpable  Bowel sounds present  Neurologic: Cranial nerves II-XII intact   Cortical functions normal  Motor system - Reflexes 2+ and symmetrical  Sensations normal  Musculoskeletal: Gait normal  Right hip joint tenderness  Integumentary: Skin normal with no rash or lesions  Lymphatic: No palpable lymph nodes in neck, axilla or groin  Extremities: No clubbing, cyanosis, edema or varicosities  Psychological: Judgement and insight normal  Mood and affect normal      Laboratory Results:  CBC with diff:   Lab Results   Component Value Date    WBC 4 39 04/30/2018    WBC 4 27 (L) 09/30/2015    RBC 4 87 04/30/2018    RBC 4 98 09/30/2015    HGB 12 8 04/30/2018    HGB 12 9 09/30/2015    HCT 39 6 04/30/2018    HCT 40 1 09/30/2015    MCV 81 (L) 04/30/2018    MCV 81 (L) 09/30/2015    MCH 26 3 (L) 04/30/2018    MCH 25 9 (L) 09/30/2015    RDW 13 8 04/30/2018    RDW 14 3 09/30/2015     04/30/2018     09/30/2015       CMP:  Lab Results   Component Value Date    CREATININE 0 83 04/30/2018    CREATININE 0 88 09/30/2015    BUN 17 04/30/2018    BUN 14 09/30/2015     04/30/2018     09/30/2015    K 3 7 04/30/2018    K 3 9 09/30/2015     04/30/2018     09/30/2015    CO2 29 04/30/2018    CO2 31 09/30/2015    GLUCOSE 93 09/30/2015    PROT 7 7 09/30/2015    ALKPHOS 92 04/30/2018    ALKPHOS 95 09/30/2015    ALT 16 04/30/2018    ALT 22 09/30/2015    AST 15 04/30/2018    AST 13 09/30/2015       Lab Results   Component Value Date    HGBA1C 4 7 04/28/2014       No results found for: TROPONINI, CKMB, CKTOTAL    Lipid Profile:   Lab Results   Component Value Date    CHOL 211 09/30/2015    CHOL 227 03/09/2015     Lab Results   Component Value Date    HDL 67 (H) 04/30/2018    HDL 71 (H) 12/22/2017     Lab Results   Component Value Date    LDLCALC 105 (H) 04/30/2018    LDLCALC 117 (H) 12/22/2017     Lab Results   Component Value Date    TRIG 90 04/30/2018    TRIG 76 12/22/2017         Health Maintenance:  Health Maintenance   Topic Date Due    Medicare Annual Wellness Visit (AWV)  1950    INFLUENZA VACCINE  07/01/2018    MAMMOGRAM  12/27/2018    Fall Risk  04/24/2019    Depression Screening PHQ  04/24/2019    Urinary Incontinence Screening  04/24/2019    DTaP,Tdap,and Td Vaccines (2 - Td) 11/17/2024    CRC Screening: Colonoscopy  08/01/2027    Pneumococcal PPSV23/PCV13 65+ Years / High and Highest Risk  Completed     Immunization History   Administered Date(s) Administered    Influenza Split High Dose Preservative Free IM 10/17/2017    Pneumococcal Conjugate 13-Valent 10/22/2015    Pneumococcal Polysaccharide PPV23 12/10/2016    Tdap 11/17/2014         Kathleen Finch MD  10/30/2018,3:30 PM

## 2018-11-17 ENCOUNTER — TELEPHONE (OUTPATIENT)
Dept: INTERNAL MEDICINE CLINIC | Facility: CLINIC | Age: 68
End: 2018-11-17

## 2018-12-13 DIAGNOSIS — Z12.31 ENCOUNTER FOR SCREENING MAMMOGRAM FOR MALIGNANT NEOPLASM OF BREAST: ICD-10-CM

## 2019-04-17 ENCOUNTER — TELEPHONE (OUTPATIENT)
Dept: INTERNAL MEDICINE CLINIC | Facility: CLINIC | Age: 69
End: 2019-04-17

## 2019-04-17 ENCOUNTER — APPOINTMENT (OUTPATIENT)
Dept: LAB | Facility: CLINIC | Age: 69
End: 2019-04-17
Payer: MEDICARE

## 2019-04-17 DIAGNOSIS — I10 ESSENTIAL HYPERTENSION: ICD-10-CM

## 2019-04-17 DIAGNOSIS — E55.9 VITAMIN D DEFICIENCY: ICD-10-CM

## 2019-04-17 LAB
25(OH)D3 SERPL-MCNC: 37.6 NG/ML (ref 30–100)
ALBUMIN SERPL BCP-MCNC: 3.6 G/DL (ref 3.5–5)
ALP SERPL-CCNC: 96 U/L (ref 46–116)
ALT SERPL W P-5'-P-CCNC: 17 U/L (ref 12–78)
ANION GAP SERPL CALCULATED.3IONS-SCNC: 7 MMOL/L (ref 4–13)
AST SERPL W P-5'-P-CCNC: 11 U/L (ref 5–45)
BASOPHILS # BLD AUTO: 0.03 THOUSANDS/ΜL (ref 0–0.1)
BASOPHILS NFR BLD AUTO: 1 % (ref 0–1)
BILIRUB SERPL-MCNC: 0.55 MG/DL (ref 0.2–1)
BUN SERPL-MCNC: 15 MG/DL (ref 5–25)
CALCIUM SERPL-MCNC: 8.8 MG/DL (ref 8.3–10.1)
CHLORIDE SERPL-SCNC: 104 MMOL/L (ref 100–108)
CHOLEST SERPL-MCNC: 203 MG/DL (ref 50–200)
CO2 SERPL-SCNC: 29 MMOL/L (ref 21–32)
CREAT SERPL-MCNC: 0.93 MG/DL (ref 0.6–1.3)
EOSINOPHIL # BLD AUTO: 0.16 THOUSAND/ΜL (ref 0–0.61)
EOSINOPHIL NFR BLD AUTO: 3 % (ref 0–6)
ERYTHROCYTE [DISTWIDTH] IN BLOOD BY AUTOMATED COUNT: 13.5 % (ref 11.6–15.1)
GFR SERPL CREATININE-BSD FRML MDRD: 72 ML/MIN/1.73SQ M
GLUCOSE P FAST SERPL-MCNC: 98 MG/DL (ref 65–99)
HCT VFR BLD AUTO: 40.2 % (ref 34.8–46.1)
HDLC SERPL-MCNC: 64 MG/DL (ref 40–60)
HGB BLD-MCNC: 12.7 G/DL (ref 11.5–15.4)
IMM GRANULOCYTES # BLD AUTO: 0.02 THOUSAND/UL (ref 0–0.2)
IMM GRANULOCYTES NFR BLD AUTO: 0 % (ref 0–2)
LDLC SERPL CALC-MCNC: 127 MG/DL (ref 0–100)
LYMPHOCYTES # BLD AUTO: 1.83 THOUSANDS/ΜL (ref 0.6–4.47)
LYMPHOCYTES NFR BLD AUTO: 39 % (ref 14–44)
MCH RBC QN AUTO: 25.8 PG (ref 26.8–34.3)
MCHC RBC AUTO-ENTMCNC: 31.6 G/DL (ref 31.4–37.4)
MCV RBC AUTO: 82 FL (ref 82–98)
MONOCYTES # BLD AUTO: 0.31 THOUSAND/ΜL (ref 0.17–1.22)
MONOCYTES NFR BLD AUTO: 7 % (ref 4–12)
NEUTROPHILS # BLD AUTO: 2.37 THOUSANDS/ΜL (ref 1.85–7.62)
NEUTS SEG NFR BLD AUTO: 50 % (ref 43–75)
NONHDLC SERPL-MCNC: 139 MG/DL
NRBC BLD AUTO-RTO: 0 /100 WBCS
PLATELET # BLD AUTO: 243 THOUSANDS/UL (ref 149–390)
PMV BLD AUTO: 11.7 FL (ref 8.9–12.7)
POTASSIUM SERPL-SCNC: 3.3 MMOL/L (ref 3.5–5.3)
PROT SERPL-MCNC: 7.3 G/DL (ref 6.4–8.2)
RBC # BLD AUTO: 4.93 MILLION/UL (ref 3.81–5.12)
SODIUM SERPL-SCNC: 140 MMOL/L (ref 136–145)
TRIGL SERPL-MCNC: 59 MG/DL
TSH SERPL DL<=0.05 MIU/L-ACNC: 1.34 UIU/ML (ref 0.36–3.74)
WBC # BLD AUTO: 4.72 THOUSAND/UL (ref 4.31–10.16)

## 2019-04-17 PROCEDURE — 80053 COMPREHEN METABOLIC PANEL: CPT

## 2019-04-17 PROCEDURE — 36415 COLL VENOUS BLD VENIPUNCTURE: CPT

## 2019-04-17 PROCEDURE — 85025 COMPLETE CBC W/AUTO DIFF WBC: CPT

## 2019-04-17 PROCEDURE — 84443 ASSAY THYROID STIM HORMONE: CPT

## 2019-04-17 PROCEDURE — 82306 VITAMIN D 25 HYDROXY: CPT

## 2019-04-17 PROCEDURE — 80061 LIPID PANEL: CPT

## 2019-05-14 ENCOUNTER — OFFICE VISIT (OUTPATIENT)
Dept: INTERNAL MEDICINE CLINIC | Facility: CLINIC | Age: 69
End: 2019-05-14
Payer: MEDICARE

## 2019-05-14 VITALS
BODY MASS INDEX: 43.09 KG/M2 | HEIGHT: 63 IN | WEIGHT: 243.2 LBS | DIASTOLIC BLOOD PRESSURE: 92 MMHG | OXYGEN SATURATION: 97 % | HEART RATE: 60 BPM | SYSTOLIC BLOOD PRESSURE: 134 MMHG

## 2019-05-14 DIAGNOSIS — M25.562 CHRONIC PAIN OF LEFT KNEE: ICD-10-CM

## 2019-05-14 DIAGNOSIS — G89.29 CHRONIC PAIN OF LEFT KNEE: ICD-10-CM

## 2019-05-14 DIAGNOSIS — E66.01 MORBID OBESITY (HCC): ICD-10-CM

## 2019-05-14 DIAGNOSIS — Z00.00 MEDICARE ANNUAL WELLNESS VISIT, SUBSEQUENT: ICD-10-CM

## 2019-05-14 DIAGNOSIS — E87.6 HYPOKALEMIA: ICD-10-CM

## 2019-05-14 DIAGNOSIS — I10 ESSENTIAL HYPERTENSION: Primary | ICD-10-CM

## 2019-05-14 DIAGNOSIS — E28.39 MENOPAUSE OVARIAN FAILURE: ICD-10-CM

## 2019-05-14 DIAGNOSIS — Z11.59 NEED FOR HEPATITIS C SCREENING TEST: ICD-10-CM

## 2019-05-14 DIAGNOSIS — E55.9 VITAMIN D DEFICIENCY: ICD-10-CM

## 2019-05-14 PROCEDURE — 99214 OFFICE O/P EST MOD 30 MIN: CPT | Performed by: INTERNAL MEDICINE

## 2019-05-14 PROCEDURE — G0439 PPPS, SUBSEQ VISIT: HCPCS | Performed by: INTERNAL MEDICINE

## 2019-05-14 RX ORDER — POTASSIUM CHLORIDE 20 MEQ/1
20 TABLET, EXTENDED RELEASE ORAL DAILY
Qty: 90 TABLET | Refills: 2 | Status: SHIPPED | OUTPATIENT
Start: 2019-05-14 | End: 2020-05-18

## 2019-05-14 RX ORDER — LOSARTAN POTASSIUM AND HYDROCHLOROTHIAZIDE 25; 100 MG/1; MG/1
1 TABLET ORAL DAILY
Qty: 90 TABLET | Refills: 2 | Status: SHIPPED | OUTPATIENT
Start: 2019-05-14 | End: 2019-11-22

## 2019-09-20 ENCOUNTER — APPOINTMENT (OUTPATIENT)
Dept: LAB | Facility: CLINIC | Age: 69
End: 2019-09-20
Payer: MEDICARE

## 2019-09-20 DIAGNOSIS — I10 ESSENTIAL HYPERTENSION: ICD-10-CM

## 2019-09-20 DIAGNOSIS — Z11.59 NEED FOR HEPATITIS C SCREENING TEST: ICD-10-CM

## 2019-09-20 DIAGNOSIS — E55.9 VITAMIN D DEFICIENCY: ICD-10-CM

## 2019-09-20 LAB
25(OH)D3 SERPL-MCNC: 26 NG/ML (ref 30–100)
ALBUMIN SERPL BCP-MCNC: 3.5 G/DL (ref 3.5–5)
ALP SERPL-CCNC: 93 U/L (ref 46–116)
ALT SERPL W P-5'-P-CCNC: 15 U/L (ref 12–78)
ANION GAP SERPL CALCULATED.3IONS-SCNC: 6 MMOL/L (ref 4–13)
AST SERPL W P-5'-P-CCNC: 13 U/L (ref 5–45)
BASOPHILS # BLD AUTO: 0.04 THOUSANDS/ΜL (ref 0–0.1)
BASOPHILS NFR BLD AUTO: 1 % (ref 0–1)
BILIRUB SERPL-MCNC: 0.53 MG/DL (ref 0.2–1)
BUN SERPL-MCNC: 14 MG/DL (ref 5–25)
CALCIUM SERPL-MCNC: 9.1 MG/DL (ref 8.3–10.1)
CHLORIDE SERPL-SCNC: 102 MMOL/L (ref 100–108)
CHOLEST SERPL-MCNC: 201 MG/DL (ref 50–200)
CO2 SERPL-SCNC: 28 MMOL/L (ref 21–32)
CREAT SERPL-MCNC: 0.94 MG/DL (ref 0.6–1.3)
EOSINOPHIL # BLD AUTO: 0.2 THOUSAND/ΜL (ref 0–0.61)
EOSINOPHIL NFR BLD AUTO: 4 % (ref 0–6)
ERYTHROCYTE [DISTWIDTH] IN BLOOD BY AUTOMATED COUNT: 14.2 % (ref 11.6–15.1)
GFR SERPL CREATININE-BSD FRML MDRD: 71 ML/MIN/1.73SQ M
GLUCOSE P FAST SERPL-MCNC: 88 MG/DL (ref 65–99)
HCT VFR BLD AUTO: 41.7 % (ref 34.8–46.1)
HCV AB SER QL: NORMAL
HDLC SERPL-MCNC: 63 MG/DL (ref 40–60)
HGB BLD-MCNC: 12.9 G/DL (ref 11.5–15.4)
IMM GRANULOCYTES # BLD AUTO: 0.01 THOUSAND/UL (ref 0–0.2)
IMM GRANULOCYTES NFR BLD AUTO: 0 % (ref 0–2)
LDLC SERPL CALC-MCNC: 121 MG/DL (ref 0–100)
LYMPHOCYTES # BLD AUTO: 1.82 THOUSANDS/ΜL (ref 0.6–4.47)
LYMPHOCYTES NFR BLD AUTO: 35 % (ref 14–44)
MCH RBC QN AUTO: 25.7 PG (ref 26.8–34.3)
MCHC RBC AUTO-ENTMCNC: 30.9 G/DL (ref 31.4–37.4)
MCV RBC AUTO: 83 FL (ref 82–98)
MONOCYTES # BLD AUTO: 0.33 THOUSAND/ΜL (ref 0.17–1.22)
MONOCYTES NFR BLD AUTO: 6 % (ref 4–12)
NEUTROPHILS # BLD AUTO: 2.84 THOUSANDS/ΜL (ref 1.85–7.62)
NEUTS SEG NFR BLD AUTO: 54 % (ref 43–75)
NONHDLC SERPL-MCNC: 138 MG/DL
NRBC BLD AUTO-RTO: 0 /100 WBCS
PLATELET # BLD AUTO: 237 THOUSANDS/UL (ref 149–390)
PMV BLD AUTO: 11.6 FL (ref 8.9–12.7)
POTASSIUM SERPL-SCNC: 3.1 MMOL/L (ref 3.5–5.3)
PROT SERPL-MCNC: 7.3 G/DL (ref 6.4–8.2)
RBC # BLD AUTO: 5.02 MILLION/UL (ref 3.81–5.12)
SODIUM SERPL-SCNC: 136 MMOL/L (ref 136–145)
TRIGL SERPL-MCNC: 83 MG/DL
TSH SERPL DL<=0.05 MIU/L-ACNC: 1.38 UIU/ML (ref 0.36–3.74)
WBC # BLD AUTO: 5.24 THOUSAND/UL (ref 4.31–10.16)

## 2019-09-20 PROCEDURE — 82306 VITAMIN D 25 HYDROXY: CPT

## 2019-09-20 PROCEDURE — 84443 ASSAY THYROID STIM HORMONE: CPT

## 2019-09-20 PROCEDURE — 80061 LIPID PANEL: CPT

## 2019-09-20 PROCEDURE — 85025 COMPLETE CBC W/AUTO DIFF WBC: CPT

## 2019-09-20 PROCEDURE — 36415 COLL VENOUS BLD VENIPUNCTURE: CPT

## 2019-09-20 PROCEDURE — 80053 COMPREHEN METABOLIC PANEL: CPT

## 2019-09-20 PROCEDURE — 86803 HEPATITIS C AB TEST: CPT

## 2019-09-24 ENCOUNTER — OFFICE VISIT (OUTPATIENT)
Dept: INTERNAL MEDICINE CLINIC | Facility: CLINIC | Age: 69
End: 2019-09-24
Payer: MEDICARE

## 2019-09-24 VITALS
HEART RATE: 74 BPM | OXYGEN SATURATION: 96 % | DIASTOLIC BLOOD PRESSURE: 80 MMHG | WEIGHT: 244.6 LBS | BODY MASS INDEX: 43.34 KG/M2 | SYSTOLIC BLOOD PRESSURE: 134 MMHG | HEIGHT: 63 IN

## 2019-09-24 DIAGNOSIS — I10 ESSENTIAL HYPERTENSION: Primary | ICD-10-CM

## 2019-09-24 DIAGNOSIS — G89.29 CHRONIC PAIN OF LEFT KNEE: ICD-10-CM

## 2019-09-24 DIAGNOSIS — E55.9 VITAMIN D DEFICIENCY: ICD-10-CM

## 2019-09-24 DIAGNOSIS — M16.11 PRIMARY OSTEOARTHRITIS OF RIGHT HIP: ICD-10-CM

## 2019-09-24 DIAGNOSIS — M25.562 CHRONIC PAIN OF LEFT KNEE: ICD-10-CM

## 2019-09-24 DIAGNOSIS — E87.6 HYPOKALEMIA: ICD-10-CM

## 2019-09-24 PROCEDURE — 99214 OFFICE O/P EST MOD 30 MIN: CPT | Performed by: INTERNAL MEDICINE

## 2019-09-24 NOTE — PROGRESS NOTES
INTERNAL MEDICINE OFFICE VISIT  [facility] [address], [place], [place] [address]  Tel: [phone]      NAME: Kamryn Civil  AGE: 78 y o  SEX: female  : 1950   MRN: [id number]    DATE: 2019  TIME: 5:02 PM      Assessment and Plan:  1  Essential hypertension    Continue present medication  - CBC and differential; Future  - Comprehensive metabolic panel; Future  - TSH, 3rd generation; Future  - Lipid panel; Future    2  Hypokalemia   continue potassium supplement regularly as she is not taking it and her potassium is low    3  Vitamin D deficiency   continue vitamin-D supplement daily as the vitamin-D level is low  - Vitamin D 25 hydroxy; Future    4  Chronic pain of left knee   she was told to see Orthopedics  - Ambulatory referral to Orthopedic Surgery; Future    5  Primary osteoarthritis of right hip   follow up with Orthopedics  - Ambulatory referral to Orthopedic Surgery; Future      - Counseling Documentation: patient was counseled regarding: diagnostic results, instructions for management, risk factor reductions, prognosis, patient and family education, impressions, risks and benefits of treatment options and importance of compliance with treatment  - Medication Side Effects: Adverse side effects of medications were reviewed with the patient/guardian today  Return for follow up visit in  4 months or earlier, if needed  Chief Complaint:  Chief Complaint   Patient presents with    Well Check     4 month         History of Present Illness:    hypertension- blood pressure stable on present medication  Hypokalemia- her recent labs showed a potassium of 3 1 as she is not taking the potassium supplement  Vitamin-D deficiency - she takes vitamin-D supplement only once a week and her vitamin-D level is low  Left knee pain and right hip pain -she was advised to see the orthopedic surgeon        Active Problem List:  Patient Active Problem List   Diagnosis    Abnormal mammogram    Essential hypertension    Hypokalemia    Knee pain, left    Seborrheic keratosis    Vitamin D deficiency    Morbid obesity ([address] )    Osteoarthritis of right hip         Past Medical History:  Past Medical History:   Diagnosis Date    Abnormal blood chemistry     Accelerated essential hypertension     Allergic rhinitis     Edema     Hypertension     Hypertension     Myalgia     Myositis          Past Surgical History:  Past Surgical History:   Procedure Laterality Date    HYSTERECTOMY      [place] COLONOSCOPY FLX DX W/COLLJ SPEC WHEN PFRMD N/A 2017    Procedure: COLONOSCOPY;  Surgeon: Erin Moran MD;  Location: [facility]; Service: Gastroenterology         Family History:  Family History   Problem Relation Age of Onset    No Known Problems Mother     Diabetes Family     Hypertension Family          Social History:  Social History     Socioeconomic History    Marital status:       Spouse name: None    Number of children: None    Years of education: None    Highest education level: None   Occupational History    None   Social Needs    Financial resource strain: None    Food insecurity:     Worry: None     Inability: None    Transportation needs:     Medical: None     Non-medical: None   Tobacco Use    Smoking status: Former Smoker     Last attempt to quit:      Years since quittin 7    Smokeless tobacco: Never Used   Substance and Sexual Activity    Alcohol use: Yes     Comment: occ    Drug use: No    Sexual activity: None   Lifestyle    Physical activity:     Days per week: 0 days     Minutes per session: 0 min    Stress: None   Relationships    Social connections:     Talks on phone: None     Gets together: None     Attends [employer] service: None     Active member of club or organization: None     Attends meetings of clubs or organizations: None     Relationship status: None    Intimate partner violence: Fear of current or ex partner: None     Emotionally abused: None     Physically abused: None     Forced sexual activity: None   Other Topics Concern    None   Social History Narrative    No advance directives         Allergies:  No Known Allergies      Medications:    Current Outpatient Medications:     Cholecalciferol (VITAMIN D3) 5000 units CAPS, Take by mouth, Disp: , Rfl:     losartan-hydrochlorothiazide (HYZAAR) 100-25 MG per tablet, Take 1 tablet by mouth daily, Disp: 90 tablet, Rfl: 2    potassium chloride (K-DUR,KLOR-CON) 20 mEq tablet, Take 1 tablet (20 mEq total) by mouth daily, Disp: 90 tablet, Rfl: 2      The following portions of the patient's history were reviewed and updated as appropriate: past medical history, past surgical history, family history, social history, allergies, current medications and active problem list       Review of Systems:  Constitutional: Denies fever, chills, weight gain, weight loss, fatigue  Eyes: Denies eye redness, eye discharge, double vision, change in visual acuity  ENT: Denies hearing loss, tinnitus, sneezing, nasal congestion, nasal discharge, sore throat   Respiratory: Denies cough, expectoration, hemoptysis, shortness of breath, wheezing  Cardiovascular: Denies chest pain, palpitations, lower extremity swelling, orthopnea, PND  Gastrointestinal: Denies abdominal pain, heartburn, nausea, vomiting, hematemesis, diarrhea, bloody stools  Genito-Urinary: Denies dysuria, frequency, difficulty in micturition, nocturia, incontinence  Musculoskeletal: Denies back pain,   Has right hip and left knee joint pain  Neurologic: Denies confusion, lightheadedness, syncope, headache, focal weakness, sensory changes, seizures  Endocrine: Denies polyuria, polydipsia, temperature intolerance  Allergy and Immunology: Denies hives, insect bite sensitivity  Hematological and Lymphatic: Denies bleeding problems, swollen glands   Psychological: Denies depression, suicidal ideation, anxiety, panic, mood swings  Dermatological: Denies pruritus, rash, skin lesion changes      Vitals:  Vitals:    09/24/19 1545   BP: 134/80   Pulse: 74   SpO2: 96%       Body mass index is 43 33 kg/m²  Weight (last 2 days)     Date/Time   Weight    09/24/19 1545   111 (244 6)                Physical Examination:  General: Patient is not in acute distress  Awake, alert, responding to commands  No weight gain or loss  Head: Normocephalic  Atraumatic  Eyes: Conjunctiva and lids with no swelling, erythema or discharge  Both pupils normal sized, round and reactive to light  Sclera nonicteric  ENT: External examination of nose and ear normal  Otoscopic examination shows translucent tympanic membranes with patent canals without erythema  Oropharynx moist with no erythema, edema, exudate or lesions  Neck: Supple  JVP not raised  Trachea midline  No masses  No thyromegaly  Lungs: No signs of increased work of breathing or respiratory distress  Bilateral bronchovascular breath sounds with no crackles or rhonchi  Chest wall: No tenderness  Cardiovascular: Normal PMI  No thrills  Regular rate and rhythm  S1 and S2 normal  No murmur, rub or gallop  Gastrointestinal: Abdomen soft, nontender  No guarding or rigidity  Liver and spleen not palpable  Bowel sounds present  Neurologic: Cranial nerves II-XII intact   Cortical functions normal  Motor system - Reflexes 2+ and symmetrical  Sensations normal  Musculoskeletal: Gait normal  No joint tenderness  Integumentary: Skin normal with no rash or lesions  Lymphatic: No palpable lymph nodes in neck, axilla or groin  Extremities: No clubbing, cyanosis, edema or varicosities  Psychological: Judgement and insight normal  Mood and affect normal      Laboratory Results:  CBC with diff:   Lab Results   Component Value Date    WBC 5 24 09/20/2019    WBC 4 27 (L) 09/30/2015    RBC 5 02 09/20/2019    RBC 4 98 09/30/2015    HGB 12 9 09/20/2019    HGB 12 9 09/30/2015    HCT 41 7 09/20/2019 HCT 40 1 09/30/2015    MCV 83 09/20/2019    MCV 81 (L) 09/30/2015    MCH 25 7 (L) 09/20/2019    MCH 25 9 (L) 09/30/2015    RDW 14 2 09/20/2019    RDW 14 3 09/30/2015     09/20/2019     09/30/2015       CMP:  Lab Results   Component Value Date    CREATININE 0 94 09/20/2019    CREATININE 0 88 09/30/2015    BUN 14 09/20/2019    BUN 14 09/30/2015     09/30/2015    K 3 1 (L) 09/20/2019    K 3 9 09/30/2015     09/20/2019     09/30/2015    CO2 28 09/20/2019    CO2 31 09/30/2015    GLUCOSE 93 09/30/2015    PROT 7 7 09/30/2015    ALKPHOS 93 09/20/2019    ALKPHOS 95 09/30/2015    ALT 15 09/20/2019    ALT 22 09/30/2015    AST 13 09/20/2019    AST 13 09/30/2015       Lab Results   Component Value Date    HGBA1C 4 7 04/28/2014       No results found for: TROPONINI, CKMB, CKTOTAL    Lipid Profile:   Lab Results   Component Value Date    CHOL 211 09/30/2015    CHOL 227 03/09/2015     Lab Results   Component Value Date    HDL 63 (H) 09/20/2019    HDL 64 (H) 04/17/2019     Lab Results   Component Value Date    LDLCALC 121 (H) 09/20/2019    LDLCALC 127 (H) 04/17/2019     Lab Results   Component Value Date    TRIG 83 09/20/2019    TRIG 59 04/17/2019       Imaging Results:  Mammo (historical)  12/27/2017       Health Maintenance:  Health Maintenance   Topic Date Due    DXA SCAN  1950    INFLUENZA VACCINE  07/01/2019    MAMMOGRAM  12/28/2019    Fall Risk  05/14/2020    Depression Screening PHQ  05/14/2020    Urinary Incontinence Screening  05/14/2020    Medicare Annual Wellness Visit (AWV)  05/14/2020    BMI: Followup Plan  05/14/2020    BMI: Adult  09/24/2020    DTaP,Tdap,and Td Vaccines (2 - Td) 11/17/2024    CRC Screening: Colonoscopy  08/01/2027    Hepatitis C Screening  Completed    Pneumococcal Vaccine: 65+ Years  Completed    Pneumococcal Vaccine: Pediatrics (0 to 5 Years) and At-Risk Patients (6 to 59 Years)  Aged Out   [facility] Food History Administered Date(s) Administered    INFLUENZA 10/17/2017    Influenza Split High Dose Preservative Free IM 10/17/2017    Pneumococcal Conjugate 13-Valent 10/22/2015    Pneumococcal Polysaccharide PPV23 12/10/2016    Tdap 11/17/2014         Mike Valdes MD  9/24/2019,5:02 PM

## 2019-10-16 ENCOUNTER — CONSULT (OUTPATIENT)
Dept: OBGYN CLINIC | Facility: CLINIC | Age: 69
End: 2019-10-16
Payer: MEDICARE

## 2019-10-16 ENCOUNTER — APPOINTMENT (OUTPATIENT)
Dept: RADIOLOGY | Facility: CLINIC | Age: 69
End: 2019-10-16
Payer: MEDICARE

## 2019-10-16 VITALS
SYSTOLIC BLOOD PRESSURE: 120 MMHG | DIASTOLIC BLOOD PRESSURE: 76 MMHG | WEIGHT: 244 LBS | HEIGHT: 63 IN | RESPIRATION RATE: 18 BRPM | BODY MASS INDEX: 43.23 KG/M2 | HEART RATE: 66 BPM

## 2019-10-16 DIAGNOSIS — M25.562 LEFT KNEE PAIN, UNSPECIFIED CHRONICITY: ICD-10-CM

## 2019-10-16 DIAGNOSIS — M76.62 TENDONITIS, ACHILLES, LEFT: ICD-10-CM

## 2019-10-16 DIAGNOSIS — M17.12 PRIMARY OSTEOARTHRITIS OF LEFT KNEE: ICD-10-CM

## 2019-10-16 DIAGNOSIS — M25.562 CHRONIC PAIN OF LEFT KNEE: ICD-10-CM

## 2019-10-16 DIAGNOSIS — G89.29 CHRONIC PAIN OF LEFT KNEE: ICD-10-CM

## 2019-10-16 DIAGNOSIS — M25.562 LEFT KNEE PAIN, UNSPECIFIED CHRONICITY: Primary | ICD-10-CM

## 2019-10-16 PROCEDURE — 73562 X-RAY EXAM OF KNEE 3: CPT

## 2019-10-16 PROCEDURE — 99203 OFFICE O/P NEW LOW 30 MIN: CPT | Performed by: ORTHOPAEDIC SURGERY

## 2019-10-16 PROCEDURE — 20610 DRAIN/INJ JOINT/BURSA W/O US: CPT | Performed by: ORTHOPAEDIC SURGERY

## 2019-10-16 RX ADMIN — LIDOCAINE HYDROCHLORIDE 2 ML: 10 INJECTION, SOLUTION INFILTRATION; PERINEURAL at 16:06

## 2019-10-16 RX ADMIN — BUPIVACAINE HYDROCHLORIDE 2 ML: 5 INJECTION, SOLUTION EPIDURAL; INTRACAUDAL at 16:06

## 2019-10-16 RX ADMIN — METHYLPREDNISOLONE ACETATE 2 ML: 40 INJECTION, SUSPENSION INTRA-ARTICULAR; INTRALESIONAL; INTRAMUSCULAR; SOFT TISSUE at 16:06

## 2019-10-16 NOTE — PROGRESS NOTES
HPI:  Patient is a 78y o  year old RHD female  who presents with chief complaint of Left knee pain  She was referred by her PCP, Dr Vivienne Saravia for on going left knee pain  She states that it's been going on for years, with no specific injury  She likes to treat her symptoms with topical medications; Aspercreme and blue Emu  She does not like to take anything orally  She uses a Chris Copper knee sleeve that helps her  She likes to go to the gym; elliptical, row machine  She has pain with walking and descending stairs  She c/o of fullness in the knee and tightness in the back of it  ROS:   General: No fever, no chills, no weight loss, no weight gain  HEENT:  No loss of hearing, no nose bleeds, no sore throat  Eyes:  No eye pain, no red eyes, no visual disturbance  Respiratory:  No cough, no shortness of breath, no wheezing  Cardiovascular:  No chest pain, no palpitations, no edema  GI: No abdominal pain, no nausea, no vomiting  Endocrine: No frequent urination, no excessive thirst  Urinary:  No dysuria, no hematuria, no incontinence  Musculoskeletal: see HPI and PE  Skin:  No rash, no wounds  Neurological:  No dizziness, no headache, no numbness  Psychiatric:  No difficulty concentrating, no depression, no suicide thoughts, no anxiety  Review of all other systems is negative    PMH:  Past Medical History:   Diagnosis Date    Abnormal blood chemistry     Accelerated essential hypertension     Allergic rhinitis     Edema     Hypertension     Hypertension     Myalgia     Myositis        PSH:  Past Surgical History:   Procedure Laterality Date    HYSTERECTOMY      [place] COLONOSCOPY FLX DX W/COLLJ SPEC WHEN PFRMD N/A 8/1/2017    Procedure: COLONOSCOPY;  Surgeon: Sofia Powers MD;  Location: [facility];   Service: Gastroenterology       Medications:  Current Outpatient Medications   Medication Sig Dispense Refill    Cholecalciferol (VITAMIN D3) 5000 units CAPS Take by mouth      losartan-hydrochlorothiazide (HYZAAR) 100-25 MG per tablet Take 1 tablet by mouth daily 90 tablet 2    potassium chloride (K-DUR,KLOR-CON) 20 mEq tablet Take 1 tablet (20 mEq total) by mouth daily 90 tablet 2    diclofenac sodium (VOLTAREN) 1 % Apply 2 g topically 4 (four) times a day 1 Tube 2     No current facility-administered medications for this visit  Allergies:  No Known Allergies    Family History:  Family History   Problem Relation Age of Onset    No Known Problems Mother     Diabetes Family     Hypertension Family        Social History:  Social History     Occupational History    Not on file   Tobacco Use    Smoking status: Former Smoker     Last attempt to quit:      Years since quittin 8    Smokeless tobacco: Never Used   Substance and Sexual Activity    Alcohol use: Yes     Frequency: 2-4 times a month     Drinks per session: 1 or 2     Comment: occ    Drug use: No    Sexual activity: Not on file       Physical Exam:  General :  Alert, cooperative, no distress, appears stated age  Blood pressure 120/76, pulse 66, resp  rate 18, height 5' 3" (1 6 m), weight 111 kg (244 lb)  Head:  Normocephalic, without obvious abnormality, atraumatic   Eyes:  Conjunctiva/corneas clear, EOM's intact,   Ears: Both ears normal appearance, no hearing deficits  Nose: Nares normal, septum midline, no drainage    Neck: Supple,  trachea midline, no adenopathy, no tenderness, no mass   Back:   Symmetric, no curvature, ROM normal, no tenderness   Lungs:   Respirations unlabored   Chest Wall:  No tenderness or deformity   Extremities: Extremities normal, atraumatic, no cyanosis or edema      Pulses: 2+ and symmetric   Skin: Skin color, texture, turgor normal, no rashes or lesions      Neurologic: Normal           Left Knee Exam     Tenderness   The patient is experiencing tenderness in the patella, patellar tendon, medial joint line and lateral joint line (lateral quadriceps)      Range of Motion   Extension: 10   Flexion: 120 Tests   Varus: negative Valgus: negative    Other   Erythema: absent  Sensation: normal  Pulse: present  Swelling: none  Effusion: effusion present    Comments:  Calf is soft     Tenderness over at the achilles attachment        Large joint arthrocentesis: L knee  Date/Time: 10/16/2019 4:06 PM  Consent given by: patient  Site marked: site marked  Timeout: Immediately prior to procedure a time out was called to verify the correct patient, procedure, equipment, support staff and site/side marked as required   Supporting Documentation  Indications: pain   Procedure Details  Location: knee - L knee  Preparation: Patient was prepped and draped in the usual sterile fashion  Needle size: 25 G  Ultrasound guidance: no  Approach: anterolateral  Medications administered: 2 mL bupivacaine (PF) 0 5 %; 2 mL lidocaine 1 %; 2 mL methylPREDNISolone acetate 40 mg/mL    Patient tolerance: patient tolerated the procedure well with no immediate complications  Dressing:  Sterile dressing applied          Imaging Studies: The following imaging studies were reviewed in office today  My findings are noted  Xrays of the Let knee shows: synovial chondromatosis- loose bodies  degenerative changes, sclerosis  No fracture no subluxation  Assessment  Encounter Diagnoses   Name Primary?  Left knee pain, unspecified chronicity Yes    Chronic pain of left knee     Primary osteoarthritis of left knee     Tendonitis, Achilles, left          Plan:  - Discussed conservative treatments with the patient: oral NSAIDs and/or Tylenol, HEP and formal physical therapy, cortisone injections and visco injections  - Assistive devices: canes, walkers   - Surgically: removal of the loose bodies and then ultimately a total knee arthroplasty  - Cortisone injection administered today   Instructed to ice  - Physical therapy for both the left knee and achilles  - Voltaren gel sent to pharmacy   - Follow up 4 weeks      Daviibe Attestation    I,: Padmini Srinivasan am acting as a scribe while in the presence of the attending physician :        I,:   Mamta Ortega MD personally performed the services described in this documentation    as scribed in my presence :

## 2019-10-16 NOTE — PATIENT INSTRUCTIONS
Achilles Tendinitis   WHAT YOU NEED TO KNOW:   What is Achilles tendinitis? Achilles tendinitis is swelling of the tendon that connects your calf muscle to your heel bone  It may happen suddenly or become a chronic condition  Your risk for Achilles tendinitis increases as you age  What causes Achilles tendinitis? · Overuse of your leg muscles    · A sudden increase in the amount or intensity of an activity    · Jumping or running on hard or uneven surfaces    · Wearing shoes that do not fit or support your foot and ankle    · Tight calf muscles    · A bone spur where your Achilles tendon attaches to your heel    · Medicines such as steroids or antibiotics  What are the signs and symptoms of Achilles tendinitis? · Pain in your heel that gets worse with activity    · Swelling in your heel or calf    · Stiffness in your heel or calf  How is Achilles tendinitis diagnosed? Your healthcare provider will examine your heel and calf for swelling  He may move your foot or ankle and ask if you have pain  Tell him when your symptoms started and which activities make the pain worse  You may need an x-ray, ultrasound, or MRI to check for bone spurs or tears in your Achilles tendon  Do not enter the MRI room with anything metal  Metal can cause serious injury  Tell the healthcare provider if you have any metal in or on your body  How is Achilles tendinitis treated? · Medicines  may be given to decrease pain and swelling  · Support devices  may include a splint, orthotic, or brace  These devices will decrease pressure on your Achilles tendon and help relieve pain  · Physical therapy  may be needed  A physical therapist teaches you exercises to help improve movement and strength, and decrease pain  You may need to practice exercises at home  · Surgery and other procedures  may be needed if other treatments do not work  Surgery may be done to repair a tear in the tendon, or to remove parts of the tendon  Other procedures may include an injection of platelets or red blood cells into your Achilles tendon  It may also include therapy with electrical currents to treat swelling and pain  What can I do to manage Achilles tendinitis? · Rest  as directed  Rest decreases swelling and prevents your tendinitis from getting worse  Your healthcare provider may tell you to stop your usual training or exercise activities  Ask him when you can return to your normal activities or exercise plan  · Apply ice  on your Achilles tendon for 15 to 20 minutes every hour or as directed  Use an ice pack, or put crushed ice in a plastic bag  Cover it with a towel  Ice helps prevent tissue damage and decreases swelling and pain  · Wear a compression bandage or use tape  as directed  This will decrease swelling and pain  Ask your healthcare provider how to wrap a compression bandage or apply tape  If you use a support device ask if you should wear a compression bandage or use tape  · Elevate  your heel above the level of your heart as often as you can  This will help decrease swelling and pain  Prop your heel on pillows or blankets to keep it elevated comfortably  · Stretch  as directed when you return to your exercise program  Always warm up your muscles and stretch before you exercise  Do cool down exercises and stretches when you are finished  This will keep your muscles loose and decrease stress on your Achilles tendon  · Do bilateral heel drop exercises as directed  Bilateral heel drops strengthen your Achilles tendon  Do not do the following exercise unless your healthcare provider says it is safe:     ¨ Stand at the edge of a stair or raised step  Hold onto the railing for balance  ¨ Place the front part of your foot on the stair or step  Let the back of your foot hang off of the stair or step  ¨ Slowly lift your heels off the ground and then slowly lower your heels past the stair   Do not move your heels quickly  This could make your injury worse  Repeat this exercise 20 times or as directed  · Slowly increase the time and intensity when you return to your exercise program   Start with short and low intensity exercises  Ask your healthcare provider how and when to increase the time and intensity of your exercise  When should I contact my healthcare provider? · You have a fever  · Your swelling or pain gets worse  · You feel or hear a sudden pop near your ankle  · You cannot bend your ankle or put pressure on your leg  · You have questions about your condition or care  CARE AGREEMENT:   You have the right to help plan your care  Learn about your health condition and how it may be treated  Discuss treatment options with your caregivers to decide what care you want to receive  You always have the right to refuse treatment  The above information is an  only  It is not intended as medical advice for individual conditions or treatments  Talk to your doctor, nurse or pharmacist before following any medical regimen to see if it is safe and effective for you  © 2017 [employer] Information is for End User's use only and may not be sold, redistributed or otherwise used for commercial purposes  All illustrations and images included in CareNotes® are the copyrighted property of A D A M [employer]  or [employer]guelles

## 2019-10-21 RX ORDER — LIDOCAINE HYDROCHLORIDE 10 MG/ML
2 INJECTION, SOLUTION INFILTRATION; PERINEURAL
Status: COMPLETED | OUTPATIENT
Start: 2019-10-16 | End: 2019-10-16

## 2019-10-21 RX ORDER — METHYLPREDNISOLONE ACETATE 40 MG/ML
2 INJECTION, SUSPENSION INTRA-ARTICULAR; INTRALESIONAL; INTRAMUSCULAR; SOFT TISSUE
Status: COMPLETED | OUTPATIENT
Start: 2019-10-16 | End: 2019-10-16

## 2019-10-21 RX ORDER — BUPIVACAINE HYDROCHLORIDE 5 MG/ML
2 INJECTION, SOLUTION EPIDURAL; INTRACAUDAL
Status: COMPLETED | OUTPATIENT
Start: 2019-10-16 | End: 2019-10-16

## 2019-11-22 ENCOUNTER — TELEPHONE (OUTPATIENT)
Dept: INTERNAL MEDICINE CLINIC | Facility: CLINIC | Age: 69
End: 2019-11-22

## 2019-11-22 DIAGNOSIS — I10 ESSENTIAL HYPERTENSION: Primary | ICD-10-CM

## 2019-11-22 RX ORDER — LOSARTAN POTASSIUM 100 MG/1
100 TABLET ORAL DAILY
Qty: 90 TABLET | Refills: 3 | Status: SHIPPED | OUTPATIENT
Start: 2019-11-22 | End: 2020-11-23 | Stop reason: SDUPTHER

## 2019-11-22 RX ORDER — HYDROCHLOROTHIAZIDE 25 MG/1
25 TABLET ORAL DAILY
Qty: 90 TABLET | Refills: 3 | Status: SHIPPED | OUTPATIENT
Start: 2019-11-22 | End: 2020-11-23 | Stop reason: SDUPTHER

## 2019-11-22 NOTE — TELEPHONE ENCOUNTER
Patient called stating that [address] [place] [address] no longer will carry her     losartan-hydrochlorothiazide (HYZAAR) 100-25 MG per tablet    She also stated that [facility] told her that they have been trying to contact our office to let us know about this matter  She is requesting that Dr Vivienne Saravia send in an alternative to [facility]  Can this be done?  Please contact Smita and let her know at  [phone]

## 2019-11-27 ENCOUNTER — OFFICE VISIT (OUTPATIENT)
Dept: OBGYN CLINIC | Facility: CLINIC | Age: 69
End: 2019-11-27
Payer: MEDICARE

## 2019-11-27 VITALS
DIASTOLIC BLOOD PRESSURE: 89 MMHG | BODY MASS INDEX: 43.94 KG/M2 | WEIGHT: 248 LBS | SYSTOLIC BLOOD PRESSURE: 154 MMHG | HEIGHT: 63 IN | HEART RATE: 64 BPM

## 2019-11-27 DIAGNOSIS — M17.12 PRIMARY OSTEOARTHRITIS OF LEFT KNEE: Primary | ICD-10-CM

## 2019-11-27 PROCEDURE — 99213 OFFICE O/P EST LOW 20 MIN: CPT | Performed by: ORTHOPAEDIC SURGERY

## 2019-11-27 NOTE — PROGRESS NOTES
HPI:  Patient is a 78y o  year old female in for f/u regarding her left knee  10/16/19 she was given CSI  Offered short term relief  She continues to note daily pain in her knee, walking is becoming difficult  She has pain with extension of knee  Notes clicking, catching in knee  She is having more bad than good days  She has not tried formal physical therapy  Moderate to severe tricompartmental osteoarthritis with several intra articular loose bodies  Wearing he copper sleeve, using topical ointments for pain  She does stay active at gym 2-3 times a week- elliptical and row machine  ROS:   General: No fever, no chills, no weight loss, no weight gain  HEENT:  No loss of hearing, no nose bleeds, no sore throat  Eyes:  No eye pain, no red eyes, no visual disturbance  Respiratory:  No cough, no shortness of breath, no wheezing  Cardiovascular:  No chest pain, no palpitations, no edema  GI: No abdominal pain, no nausea, no vomiting  Endocrine: No frequent urination, no excessive thirst  Urinary:  No dysuria, no hematuria, no incontinence  Musculoskeletal: see HPI and PE  Skin:  No rash, no wounds  Neurological:  No dizziness, no headache, no numbness  Psychiatric:  No difficulty concentrating, no depression, no suicide thoughts, no anxiety  Review of all other systems is negative    PMH:  Past Medical History:   Diagnosis Date    Abnormal blood chemistry     Accelerated essential hypertension     Allergic rhinitis     Edema     Hypertension     Hypertension     Myalgia     Myositis        PSH:  Past Surgical History:   Procedure Laterality Date    HYSTERECTOMY      VA COLONOSCOPY FLX DX W/COLLJ SPEC WHEN PFRMD N/A 8/1/2017    Procedure: COLONOSCOPY;  Surgeon: Pat Rivera MD;  Location: [facility];   Service: Gastroenterology       Medications:  Current Outpatient Medications   Medication Sig Dispense Refill    Cholecalciferol (VITAMIN D3) 5000 units CAPS Take by mouth      hydrochlorothiazide (HYDRODIURIL) 25 mg tablet Take 1 tablet (25 mg total) by mouth daily 90 tablet 3    losartan (COZAAR) 100 MG tablet Take 1 tablet (100 mg total) by mouth daily 90 tablet 3    potassium chloride (K-DUR,KLOR-CON) 20 mEq tablet Take 1 tablet (20 mEq total) by mouth daily 90 tablet 2     No current facility-administered medications for this visit  Allergies:  No Known Allergies    Family History:  Family History   Problem Relation Age of Onset    No Known Problems Mother     Diabetes Family     Hypertension Family        Social History:  Social History     Occupational History    Not on file   Tobacco Use    Smoking status: Former Smoker     Last attempt to quit:      Years since quittin 9    Smokeless tobacco: Never Used   Substance and Sexual Activity    Alcohol use: Yes     Frequency: 2-4 times a month     Drinks per session: 1 or 2     Comment: occ    Drug use: No    Sexual activity: Not on file       Physical Exam:  General :  Alert, cooperative, no distress, appears stated age  Height 5' 3" (1 6 m), weight 112 kg (248 lb)  Head:  Normocephalic, without obvious abnormality, atraumatic   Eyes:  Conjunctiva/corneas clear, EOM's intact,   Ears: Both ears normal appearance, no hearing deficits  Nose: Nares normal, septum midline, no drainage    Neck: Supple,  trachea midline, no adenopathy, no tenderness, no mass   Back:   Symmetric, no curvature, ROM normal, no tenderness   Lungs:   Respirations unlabored   Chest Wall:  No tenderness or deformity   Extremities: Extremities normal, atraumatic, no cyanosis or edema      Pulses: 2+ and symmetric   Skin: Skin color, texture, turgor normal, no rashes or lesions      Neurologic: Normal           Right Knee Exam     Range of Motion   Extension: -10   Flexion: 120       Left Knee Exam     Muscle Strength   The patient has normal left knee strength      Tenderness   The patient is experiencing tenderness in the lateral retinaculum  Range of Motion   Extension: -10   Flexion: 100     Tests   Varus: negative Valgus: negative  Lachman:  Anterior - negative      Drawer:  Anterior - negative     Posterior - negative    Other   Erythema: absent  Scars: absent  Sensation: normal  Pulse: present  Effusion: no effusion present    Comments: Well tracking patella with crepitation             Imaging Studies: The following imaging studies were reviewed in office today  My findings are noted  No new studies to review  Assessment  Encounter Diagnosis   Name Primary?  Primary osteoarthritis of left knee Yes         Plan:    Patient had short term relief from CSI given 6 weeks ago  Will try gel injection next, precert order placed  Patient is not ready for total knee replacement, she declined formal physical therapy, will continue with HEP at gym  Continue with topical creams, activity modification  Patient's BMI is not ideal for replacement surgery, needs to be around 40  See back to start visco series         Scribe Attestation    I,:   Parviz Ryan am acting as a scribe while in the presence of the attending physician :        I,:   Elbert Kemp MD personally performed the services described in this documentation    as scribed in my presence :

## 2019-11-30 ENCOUNTER — TELEPHONE (OUTPATIENT)
Dept: OBGYN CLINIC | Facility: HOSPITAL | Age: 69
End: 2019-11-30

## 2019-11-30 NOTE — TELEPHONE ENCOUNTER
TO BE COMPLETED BY CENTRAL AUTH TEAM:     Physician: DR Francia Esquivel     Medication: Lacretia Abigail    Number of Injections in Series (Appointments scheduled 1 week apart from one another): 3    Schedule after this date: UPON AVAILABILITY    Billing Info: Buy and Bill/Specialty Pharmacy-Patient Supply>> BUY & BILL    Appointment Message Line:  (please copy and paste appointment message line when scheduling appointment) LEFT KNEE #1,#2,#3 EUFLEXXA/BUY & BILL    Additional Comments:

## 2020-01-09 ENCOUNTER — TELEPHONE (OUTPATIENT)
Dept: INTERNAL MEDICINE CLINIC | Facility: CLINIC | Age: 70
End: 2020-01-09

## 2020-01-09 DIAGNOSIS — E28.39 MENOPAUSE OVARIAN FAILURE: ICD-10-CM

## 2020-01-27 ENCOUNTER — APPOINTMENT (OUTPATIENT)
Dept: LAB | Facility: CLINIC | Age: 70
End: 2020-01-27
Payer: MEDICARE

## 2020-01-27 DIAGNOSIS — E55.9 VITAMIN D DEFICIENCY: ICD-10-CM

## 2020-01-27 DIAGNOSIS — I10 ESSENTIAL HYPERTENSION: ICD-10-CM

## 2020-01-27 LAB
25(OH)D3 SERPL-MCNC: 35.8 NG/ML (ref 30–100)
ALBUMIN SERPL BCP-MCNC: 3.6 G/DL (ref 3.5–5)
ALP SERPL-CCNC: 95 U/L (ref 46–116)
ALT SERPL W P-5'-P-CCNC: 20 U/L (ref 12–78)
ANION GAP SERPL CALCULATED.3IONS-SCNC: 5 MMOL/L (ref 4–13)
AST SERPL W P-5'-P-CCNC: 10 U/L (ref 5–45)
BASOPHILS # BLD AUTO: 0.04 THOUSANDS/ΜL (ref 0–0.1)
BASOPHILS NFR BLD AUTO: 1 % (ref 0–1)
BILIRUB SERPL-MCNC: 0.59 MG/DL (ref 0.2–1)
BUN SERPL-MCNC: 16 MG/DL (ref 5–25)
CALCIUM SERPL-MCNC: 9.5 MG/DL (ref 8.3–10.1)
CHLORIDE SERPL-SCNC: 104 MMOL/L (ref 100–108)
CHOLEST SERPL-MCNC: 210 MG/DL (ref 50–200)
CO2 SERPL-SCNC: 31 MMOL/L (ref 21–32)
CREAT SERPL-MCNC: 0.91 MG/DL (ref 0.6–1.3)
EOSINOPHIL # BLD AUTO: 0.17 THOUSAND/ΜL (ref 0–0.61)
EOSINOPHIL NFR BLD AUTO: 4 % (ref 0–6)
ERYTHROCYTE [DISTWIDTH] IN BLOOD BY AUTOMATED COUNT: 13.7 % (ref 11.6–15.1)
GFR SERPL CREATININE-BSD FRML MDRD: 74 ML/MIN/1.73SQ M
GLUCOSE P FAST SERPL-MCNC: 97 MG/DL (ref 65–99)
HCT VFR BLD AUTO: 41.3 % (ref 34.8–46.1)
HDLC SERPL-MCNC: 69 MG/DL
HGB BLD-MCNC: 13 G/DL (ref 11.5–15.4)
IMM GRANULOCYTES # BLD AUTO: 0.01 THOUSAND/UL (ref 0–0.2)
IMM GRANULOCYTES NFR BLD AUTO: 0 % (ref 0–2)
LDLC SERPL CALC-MCNC: 125 MG/DL (ref 0–100)
LYMPHOCYTES # BLD AUTO: 1.68 THOUSANDS/ΜL (ref 0.6–4.47)
LYMPHOCYTES NFR BLD AUTO: 34 % (ref 14–44)
MCH RBC QN AUTO: 25.8 PG (ref 26.8–34.3)
MCHC RBC AUTO-ENTMCNC: 31.5 G/DL (ref 31.4–37.4)
MCV RBC AUTO: 82 FL (ref 82–98)
MONOCYTES # BLD AUTO: 0.36 THOUSAND/ΜL (ref 0.17–1.22)
MONOCYTES NFR BLD AUTO: 7 % (ref 4–12)
NEUTROPHILS # BLD AUTO: 2.65 THOUSANDS/ΜL (ref 1.85–7.62)
NEUTS SEG NFR BLD AUTO: 54 % (ref 43–75)
NONHDLC SERPL-MCNC: 141 MG/DL
NRBC BLD AUTO-RTO: 0 /100 WBCS
PLATELET # BLD AUTO: 241 THOUSANDS/UL (ref 149–390)
PMV BLD AUTO: 11.2 FL (ref 8.9–12.7)
POTASSIUM SERPL-SCNC: 3.4 MMOL/L (ref 3.5–5.3)
PROT SERPL-MCNC: 7.4 G/DL (ref 6.4–8.2)
RBC # BLD AUTO: 5.03 MILLION/UL (ref 3.81–5.12)
SODIUM SERPL-SCNC: 140 MMOL/L (ref 136–145)
TRIGL SERPL-MCNC: 81 MG/DL
TSH SERPL DL<=0.05 MIU/L-ACNC: 1.38 UIU/ML (ref 0.36–3.74)
WBC # BLD AUTO: 4.91 THOUSAND/UL (ref 4.31–10.16)

## 2020-01-27 PROCEDURE — 84443 ASSAY THYROID STIM HORMONE: CPT

## 2020-01-27 PROCEDURE — 82306 VITAMIN D 25 HYDROXY: CPT

## 2020-01-27 PROCEDURE — 80053 COMPREHEN METABOLIC PANEL: CPT

## 2020-01-27 PROCEDURE — 36415 COLL VENOUS BLD VENIPUNCTURE: CPT

## 2020-01-27 PROCEDURE — 85025 COMPLETE CBC W/AUTO DIFF WBC: CPT

## 2020-01-27 PROCEDURE — 80061 LIPID PANEL: CPT

## 2020-02-04 ENCOUNTER — OFFICE VISIT (OUTPATIENT)
Dept: INTERNAL MEDICINE CLINIC | Facility: CLINIC | Age: 70
End: 2020-02-04
Payer: MEDICARE

## 2020-02-04 VITALS
HEART RATE: 68 BPM | HEIGHT: 63 IN | DIASTOLIC BLOOD PRESSURE: 80 MMHG | BODY MASS INDEX: 44.23 KG/M2 | WEIGHT: 249.6 LBS | SYSTOLIC BLOOD PRESSURE: 136 MMHG

## 2020-02-04 DIAGNOSIS — E55.9 VITAMIN D DEFICIENCY: ICD-10-CM

## 2020-02-04 DIAGNOSIS — E87.6 HYPOKALEMIA: ICD-10-CM

## 2020-02-04 DIAGNOSIS — I10 ESSENTIAL HYPERTENSION: Primary | ICD-10-CM

## 2020-02-04 DIAGNOSIS — E66.01 MORBID OBESITY (HCC): ICD-10-CM

## 2020-02-04 DIAGNOSIS — G89.29 CHRONIC PAIN OF LEFT KNEE: ICD-10-CM

## 2020-02-04 DIAGNOSIS — M25.562 CHRONIC PAIN OF LEFT KNEE: ICD-10-CM

## 2020-02-04 PROCEDURE — 99214 OFFICE O/P EST MOD 30 MIN: CPT | Performed by: INTERNAL MEDICINE

## 2020-02-04 PROCEDURE — 3079F DIAST BP 80-89 MM HG: CPT | Performed by: INTERNAL MEDICINE

## 2020-02-04 PROCEDURE — 3075F SYST BP GE 130 - 139MM HG: CPT | Performed by: INTERNAL MEDICINE

## 2020-02-04 PROCEDURE — 1160F RVW MEDS BY RX/DR IN RCRD: CPT | Performed by: INTERNAL MEDICINE

## 2020-02-04 PROCEDURE — 1036F TOBACCO NON-USER: CPT | Performed by: INTERNAL MEDICINE

## 2020-02-04 PROCEDURE — 3008F BODY MASS INDEX DOCD: CPT | Performed by: INTERNAL MEDICINE

## 2020-02-04 PROCEDURE — 4040F PNEUMOC VAC/ADMIN/RCVD: CPT | Performed by: INTERNAL MEDICINE

## 2020-02-04 NOTE — PROGRESS NOTES
INTERNAL MEDICINE OFFICE VISIT  [facility] AT [address] [address]  Tel: [phone]      NAME: Silvia Osman  AGE: 78 y o  SEX: female  : 1950   MRN: [id number]    DATE: 2020  TIME: 4:55 PM      Assessment and Plan:  1  Essential hypertension   continue present medications  - CBC and differential; Future  - Comprehensive metabolic panel; Future  - Lipid panel; Future  - TSH, 3rd generation; Future    2  Hypokalemia   continue potassium supplement    3  Vitamin D deficiency   continue vitamin-D at the same dose  - Vitamin D 25 hydroxy; Future    4  Chronic pain of left knee   follow up with Orthopedics    5  Morbid obesity ([facility] [address] )  BMI Counseling: Body mass index is 44 21 kg/m²  The BMI is above normal  Nutrition recommendations include decreasing portion sizes, encouraging healthy choices of fruits and vegetables and moderation in carbohydrate intake  Exercise recommendations include moderate physical activity 150 minutes/week  No pharmacotherapy was ordered  - Counseling Documentation: patient was counseled regarding: diagnostic results, instructions for management, risk factor reductions, prognosis, patient and family education, risks and benefits of treatment options and importance of compliance with treatment  - Medication Side Effects: Adverse side effects of medications were reviewed with the patient/guardian today  Return for follow up visit in  6 months or earlier, if needed        Chief Complaint:  Chief Complaint   Patient presents with    Well Check     4 month         History of Present Illness:    hypertension- blood pressure stable on present medication  Hypokalemia -stable   Vitamin-D deficiency - her vitamin-D level was good at 29 on the present medication  Left knee pain- she has been following up with Orthopedics but is still hurting  A morbid obesity -she was told to try to lose weight      Active Problem List:  Patient Active Problem List   Diagnosis    Abnormal mammogram    Essential hypertension    Hypokalemia    Knee pain, left    Seborrheic keratosis    Vitamin D deficiency    Morbid obesity ([address] )    Osteoarthritis of right hip         Past Medical History:  Past Medical History:   Diagnosis Date    Abnormal blood chemistry     Accelerated essential hypertension     Allergic rhinitis     Edema     Hypertension     Hypertension     Myalgia     Myositis          Past Surgical History:  Past Surgical History:   Procedure Laterality Date    HYSTERECTOMY      IA COLONOSCOPY FLX DX W/COLLJ SPEC WHEN PFRMD N/A 2017    Procedure: COLONOSCOPY;  Surgeon: Veda Schuler MD;  Location: [facility]; Service: Gastroenterology         Family History:  Family History   Problem Relation Age of Onset    No Known Problems Mother     Diabetes Family     Hypertension Family          Social History:  Social History     Socioeconomic History    Marital status:       Spouse name: None    Number of children: None    Years of education: None    Highest education level: None   Occupational History    None   Social Needs    Financial resource strain: None    Food insecurity:     Worry: None     Inability: None    Transportation needs:     Medical: None     Non-medical: None   Tobacco Use    Smoking status: Former Smoker     Last attempt to quit:      Years since quittin 1    Smokeless tobacco: Never Used   Substance and Sexual Activity    Alcohol use: Yes     Frequency: 2-4 times a month     Drinks per session: 1 or 2     Comment: occ    Drug use: No    Sexual activity: None   Lifestyle    Physical activity:     Days per week: 0 days     Minutes per session: 0 min    Stress: None   Relationships    Social connections:     Talks on phone: None     Gets together: None     Attends Taoism service: None     Active member of club or organization: None     Attends meetings of clubs or organizations: None     Relationship status: None    Intimate partner violence:     Fear of current or ex partner: None     Emotionally abused: None     Physically abused: None     Forced sexual activity: None   Other Topics Concern    None   Social History Narrative    No advance directives         Allergies:  No Known Allergies      Medications:    Current Outpatient Medications:     Cholecalciferol (VITAMIN D3) 5000 units CAPS, Take by mouth, Disp: , Rfl:     hydrochlorothiazide (HYDRODIURIL) 25 mg tablet, Take 1 tablet (25 mg total) by mouth daily, Disp: 90 tablet, Rfl: 3    losartan (COZAAR) 100 MG tablet, Take 1 tablet (100 mg total) by mouth daily, Disp: 90 tablet, Rfl: 3    potassium chloride (K-DUR,KLOR-CON) 20 mEq tablet, Take 1 tablet (20 mEq total) by mouth daily, Disp: 90 tablet, Rfl: 2      The following portions of the patient's history were reviewed and updated as appropriate: past medical history, past surgical history, family history, social history, allergies, current medications and active problem list       Review of Systems:  Constitutional: Denies fever, chills, weight gain, weight loss, fatigue  Eyes: Denies eye redness, eye discharge, double vision, change in visual acuity  ENT: Denies hearing loss, tinnitus, sneezing, nasal congestion, nasal discharge, sore throat   Respiratory: Denies cough, expectoration, hemoptysis, shortness of breath, wheezing  Cardiovascular: Denies chest pain, palpitations, lower extremity swelling, orthopnea, PND  Gastrointestinal: Denies abdominal pain, heartburn, nausea, vomiting, hematemesis, diarrhea, bloody stools  Genito-Urinary: Denies dysuria, frequency, difficulty in micturition, nocturia, incontinence  Musculoskeletal: Denies back pain, has oint pain,    Neurologic: Denies confusion, lightheadedness, syncope, headache, focal weakness, sensory changes, seizures  Endocrine: Denies polyuria, polydipsia, temperature intolerance  Allergy and Immunology: Denies hives, insect bite sensitivity  Hematological and Lymphatic: Denies bleeding problems, swollen glands   Psychological: Denies depression, suicidal ideation, anxiety, panic, mood swings  Dermatological: Denies pruritus, rash, skin lesion changes      Vitals:  Vitals:    02/04/20 1530   BP: 136/80   Pulse: Body mass index is 44 21 kg/m²  Weight (last 2 days)     Date/Time   Weight    02/04/20 1517   113 (249 6)                Physical Examination:  General: Patient is not in acute distress  Awake, alert, responding to commands  No weight gain or loss  Head: Normocephalic  Atraumatic  Eyes: Conjunctiva and lids with no swelling, erythema or discharge  Both pupils normal sized, round and reactive to light  Sclera nonicteric  ENT: External examination of nose and ear normal  Otoscopic examination shows translucent tympanic membranes with patent canals without erythema  Oropharynx moist with no erythema, edema, exudate or lesions  Neck: Supple  JVP not raised  Trachea midline  No masses  No thyromegaly  Lungs: No signs of increased work of breathing or respiratory distress  Bilateral bronchovascular breath sounds with no crackles or rhonchi  Chest wall: No tenderness  Cardiovascular: Normal PMI  No thrills  Regular rate and rhythm  S1 and S2 normal  No murmur, rub or gallop  Gastrointestinal: Abdomen soft, nontender  No guarding or rigidity  Liver and spleen not palpable  Bowel sounds present  Neurologic: Cranial nerves II-XII intact   Cortical functions normal  Motor system - Reflexes 2+ and symmetrical  Sensations normal  Musculoskeletal: Gait normal  No joint tenderness  Integumentary: Skin normal with no rash or lesions  Lymphatic: No palpable lymph nodes in neck, axilla or groin  Extremities: No clubbing, cyanosis, edema or varicosities  Psychological: Judgement and insight normal  Mood and affect normal      Laboratory Results:  CBC with diff:   Lab Results   Component Value Date    WBC 4 91 01/27/2020    WBC 4 27 (L) 09/30/2015    RBC 5 03 01/27/2020    RBC 4 98 09/30/2015    HGB 13 0 01/27/2020    HGB 12 9 09/30/2015    HCT 41 3 01/27/2020    HCT 40 1 09/30/2015    MCV 82 01/27/2020    MCV 81 (L) 09/30/2015    MCH 25 8 (L) 01/27/2020    MCH 25 9 (L) 09/30/2015    RDW 13 7 01/27/2020    RDW 14 3 09/30/2015     01/27/2020     09/30/2015       CMP:  Lab Results   Component Value Date    CREATININE 0 91 01/27/2020    CREATININE 0 88 09/30/2015    BUN 16 01/27/2020    BUN 14 09/30/2015     09/30/2015    K 3 4 (L) 01/27/2020    K 3 9 09/30/2015     01/27/2020     09/30/2015    CO2 31 01/27/2020    CO2 31 09/30/2015    GLUCOSE 93 09/30/2015    PROT 7 7 09/30/2015    ALKPHOS 95 01/27/2020    ALKPHOS 95 09/30/2015    ALT 20 01/27/2020    ALT 22 09/30/2015    AST 10 01/27/2020    AST 13 09/30/2015       Lab Results   Component Value Date    HGBA1C 4 7 04/28/2014       No results found for: TROPONINI, CKMB, CKTOTAL    Lipid Profile:   Lab Results   Component Value Date    CHOL 211 09/30/2015    CHOL 227 03/09/2015     Lab Results   Component Value Date    HDL 69 01/27/2020    HDL 63 (H) 09/20/2019     Lab Results   Component Value Date    LDLCALC 125 (H) 01/27/2020    LDLCALC 121 (H) 09/20/2019     Lab Results   Component Value Date    TRIG 81 01/27/2020    TRIG 83 09/20/2019       Imaging Results:  XR knee 3 vw left non injury  Narrative: LEFT KNEE    INDICATION:   [id number]: Pain in left knee  COMPARISON:  None    VIEWS:  XR KNEE 3 VW LEFT NON INJURY     FINDINGS:    There is no acute fracture or dislocation  There is no joint effusion  There is moderately severe tricompartmental osteoarthritis of the knee which is characterized mostly by the presence of osteophytic spurring and several intra-articular loose bodies which are visible in the upper joint recess and probably also in the   anterior aspect of the knee joint near the femorotibial articulation    There may also be a popliteal fossa intra-articular loose body  There is a small bone spur at the top of the patella  There is probably subcortical degenerative cyst formation in   the patella and perhaps in the femur  No lytic or blastic lesions are seen  Soft tissues are unremarkable  Impression: Tricompartmental osteoarthritis  Several large intra-articular loose bodies      Workstation performed: [id number]       Health Maintenance:  Health Maintenance   Topic Date Due    Influenza Vaccine  07/01/2019    Fall Risk  05/14/2020    Medicare Annual Wellness Visit (AWV)  05/14/2020    BMI: Followup Plan  05/14/2020    MAMMOGRAM  12/30/2020    Depression Screening PHQ  02/04/2021    BMI: Adult  02/04/2021    DXA SCAN  01/09/2022    DTaP,Tdap,and Td Vaccines (2 - Td) 11/17/2024    CRC Screening: Colonoscopy  08/01/2027    Hepatitis C Screening  Completed    Pneumococcal Vaccine: 65+ Years  Completed    Pneumococcal Vaccine: Pediatrics (0 to 5 Years) and At-Risk Patients (6 to 59 Years)  Aged Out    HIB Vaccine  Aged Out    Hepatitis B Vaccine  Aged Out    IPV Vaccine  Aged Out    Hepatitis A Vaccine  Aged Out    Meningococcal ACWY Vaccine  Aged Out    HPV Vaccine  Aged [employer] History   Administered Date(s) Administered    INFLUENZA 10/17/2017    Influenza Split High Dose Preservative Free IM 10/17/2017    Pneumococcal Conjugate 13-Valent 10/22/2015    Pneumococcal Polysaccharide PPV23 12/10/2016    Tdap 11/17/2014         Tanisha Ferguson MD  2/4/2020,4:55 PM

## 2020-03-12 ENCOUNTER — TELEPHONE (OUTPATIENT)
Dept: OBGYN CLINIC | Facility: OTHER | Age: 70
End: 2020-03-12

## 2020-05-13 ENCOUNTER — OFFICE VISIT (OUTPATIENT)
Dept: OBGYN CLINIC | Facility: CLINIC | Age: 70
End: 2020-05-13
Payer: MEDICARE

## 2020-05-13 VITALS
HEART RATE: 73 BPM | RESPIRATION RATE: 20 BRPM | DIASTOLIC BLOOD PRESSURE: 72 MMHG | BODY MASS INDEX: 45.11 KG/M2 | HEIGHT: 63 IN | SYSTOLIC BLOOD PRESSURE: 112 MMHG | WEIGHT: 254.6 LBS

## 2020-05-13 DIAGNOSIS — M25.562 LEFT KNEE PAIN, UNSPECIFIED CHRONICITY: ICD-10-CM

## 2020-05-13 DIAGNOSIS — M17.12 PRIMARY OSTEOARTHRITIS OF LEFT KNEE: Primary | ICD-10-CM

## 2020-05-13 PROCEDURE — 20610 DRAIN/INJ JOINT/BURSA W/O US: CPT | Performed by: ORTHOPAEDIC SURGERY

## 2020-05-13 RX ADMIN — Medication 20 MG: at 11:15

## 2020-05-18 DIAGNOSIS — E87.6 HYPOKALEMIA: ICD-10-CM

## 2020-05-18 RX ORDER — POTASSIUM CHLORIDE 20 MEQ/1
TABLET, EXTENDED RELEASE ORAL
Qty: 30 TABLET | Refills: 0 | Status: SHIPPED | OUTPATIENT
Start: 2020-05-18 | End: 2020-11-23 | Stop reason: SDUPTHER

## 2020-05-19 ENCOUNTER — TELEPHONE (OUTPATIENT)
Dept: OBGYN CLINIC | Facility: CLINIC | Age: 70
End: 2020-05-19

## 2020-06-08 RX ORDER — HYALURONATE SODIUM 10 MG/ML
20 SYRINGE (ML) INTRAARTICULAR
Status: COMPLETED | OUTPATIENT
Start: 2020-05-13 | End: 2020-05-13

## 2020-11-23 DIAGNOSIS — E87.6 HYPOKALEMIA: ICD-10-CM

## 2020-11-23 DIAGNOSIS — I10 ESSENTIAL HYPERTENSION: ICD-10-CM

## 2020-11-23 RX ORDER — LOSARTAN POTASSIUM 100 MG/1
100 TABLET ORAL DAILY
Qty: 90 TABLET | Refills: 3 | Status: SHIPPED | OUTPATIENT
Start: 2020-11-23 | End: 2021-11-30 | Stop reason: SDUPTHER

## 2020-11-23 RX ORDER — HYDROCHLOROTHIAZIDE 25 MG/1
25 TABLET ORAL DAILY
Qty: 90 TABLET | Refills: 3 | Status: SHIPPED | OUTPATIENT
Start: 2020-11-23 | End: 2021-11-30 | Stop reason: SDUPTHER

## 2020-11-23 RX ORDER — POTASSIUM CHLORIDE 20 MEQ/1
20 TABLET, EXTENDED RELEASE ORAL DAILY
Qty: 30 TABLET | Refills: 0 | Status: SHIPPED | OUTPATIENT
Start: 2020-11-23 | End: 2022-03-30 | Stop reason: SDUPTHER

## 2021-04-15 ENCOUNTER — IMMUNIZATIONS (OUTPATIENT)
Dept: FAMILY MEDICINE CLINIC | Facility: HOSPITAL | Age: 71
End: 2021-04-15

## 2021-04-15 DIAGNOSIS — Z23 ENCOUNTER FOR IMMUNIZATION: Primary | ICD-10-CM

## 2021-04-15 PROCEDURE — 91300 SARS-COV-2 / COVID-19 MRNA VACCINE (PFIZER-BIONTECH) 30 MCG: CPT | Performed by: INTERNAL MEDICINE

## 2021-04-15 PROCEDURE — 0001A SARS-COV-2 / COVID-19 MRNA VACCINE (PFIZER-BIONTECH) 30 MCG: CPT | Performed by: INTERNAL MEDICINE

## 2021-05-10 ENCOUNTER — IMMUNIZATIONS (OUTPATIENT)
Dept: FAMILY MEDICINE CLINIC | Facility: HOSPITAL | Age: 71
End: 2021-05-10

## 2021-05-10 DIAGNOSIS — Z23 ENCOUNTER FOR IMMUNIZATION: Primary | ICD-10-CM

## 2021-05-10 PROCEDURE — 0002A SARS-COV-2 / COVID-19 MRNA VACCINE (PFIZER-BIONTECH) 30 MCG: CPT

## 2021-05-10 PROCEDURE — 91300 SARS-COV-2 / COVID-19 MRNA VACCINE (PFIZER-BIONTECH) 30 MCG: CPT

## 2021-11-30 DIAGNOSIS — I10 ESSENTIAL HYPERTENSION: ICD-10-CM

## 2021-12-01 RX ORDER — LOSARTAN POTASSIUM 100 MG/1
100 TABLET ORAL DAILY
Qty: 90 TABLET | Refills: 0 | Status: SHIPPED | OUTPATIENT
Start: 2021-12-01 | End: 2022-03-30

## 2021-12-01 RX ORDER — HYDROCHLOROTHIAZIDE 25 MG/1
25 TABLET ORAL DAILY
Qty: 90 TABLET | Refills: 0 | Status: SHIPPED | OUTPATIENT
Start: 2021-12-01 | End: 2022-03-30 | Stop reason: SDUPTHER

## 2022-03-24 ENCOUNTER — RA CDI HCC (OUTPATIENT)
Dept: OTHER | Facility: HOSPITAL | Age: 72
End: 2022-03-24

## 2022-03-24 NOTE — PROGRESS NOTES
[address]  coding opportunities       Chart reviewed, no opportunity found: CHART REVIEWED, NO OPPORTUNITY FOUND        Patients Insurance     Medicare Insurance: Medicare

## 2022-03-29 ENCOUNTER — TELEPHONE (OUTPATIENT)
Dept: ADMINISTRATIVE | Facility: OTHER | Age: 72
End: 2022-03-29

## 2022-03-29 NOTE — TELEPHONE ENCOUNTER
----- Message from [facility] sent at 3/29/2022 10:13 AM EDT -----  03/29/22 10:13 AM    Hello, our patient Rosenda Koch has had DEXA Scan completed/performed  Please assist in updating the patient chart by pulling a previous Electronic Medical Record (EMR) document  The previous EMR is [facility]  The date of service is 01/2020      Thank you,  [address]

## 2022-03-30 ENCOUNTER — OFFICE VISIT (OUTPATIENT)
Dept: INTERNAL MEDICINE CLINIC | Facility: CLINIC | Age: 72
End: 2022-03-30
Payer: MEDICARE

## 2022-03-30 VITALS
SYSTOLIC BLOOD PRESSURE: 150 MMHG | WEIGHT: 252.8 LBS | BODY MASS INDEX: 46.52 KG/M2 | OXYGEN SATURATION: 96 % | DIASTOLIC BLOOD PRESSURE: 78 MMHG | RESPIRATION RATE: 14 BRPM | TEMPERATURE: 97.1 F | HEART RATE: 63 BPM | HEIGHT: 62 IN

## 2022-03-30 DIAGNOSIS — E66.01 MORBID OBESITY (HCC): ICD-10-CM

## 2022-03-30 DIAGNOSIS — M16.11 PRIMARY OSTEOARTHRITIS OF RIGHT HIP: ICD-10-CM

## 2022-03-30 DIAGNOSIS — E87.6 HYPOKALEMIA: ICD-10-CM

## 2022-03-30 DIAGNOSIS — Z00.00 MEDICARE ANNUAL WELLNESS VISIT, SUBSEQUENT: ICD-10-CM

## 2022-03-30 DIAGNOSIS — E55.9 VITAMIN D DEFICIENCY: ICD-10-CM

## 2022-03-30 DIAGNOSIS — I10 ESSENTIAL HYPERTENSION: Primary | ICD-10-CM

## 2022-03-30 PROCEDURE — 99214 OFFICE O/P EST MOD 30 MIN: CPT | Performed by: INTERNAL MEDICINE

## 2022-03-30 PROCEDURE — 1123F ACP DISCUSS/DSCN MKR DOCD: CPT | Performed by: INTERNAL MEDICINE

## 2022-03-30 PROCEDURE — G0439 PPPS, SUBSEQ VISIT: HCPCS | Performed by: INTERNAL MEDICINE

## 2022-03-30 RX ORDER — HYDROCHLOROTHIAZIDE 25 MG/1
25 TABLET ORAL DAILY
Qty: 90 TABLET | Refills: 3 | Status: SHIPPED | OUTPATIENT
Start: 2022-03-30

## 2022-03-30 RX ORDER — AMLODIPINE BESYLATE 5 MG/1
5 TABLET ORAL DAILY
Qty: 90 TABLET | Refills: 3 | Status: SHIPPED | OUTPATIENT
Start: 2022-03-30

## 2022-03-30 RX ORDER — POTASSIUM CHLORIDE 20 MEQ/1
20 TABLET, EXTENDED RELEASE ORAL DAILY
Qty: 90 TABLET | Refills: 3 | Status: SHIPPED | OUTPATIENT
Start: 2022-03-30

## 2022-03-30 NOTE — PROGRESS NOTES
Assessment and Plan:     Problem List Items Addressed This Visit     None           Preventive health issues were discussed with patient, and age appropriate screening tests were ordered as noted in patient's After Visit Summary  Personalized health advice and appropriate referrals for health education or preventive services given if needed, as noted in patient's After Visit Summary  History of Present Illness:     Patient presents for Medicare Annual Wellness visit    Patient Care Team:  Nitesh Davalos MD as PCP - General  MD Aquiles Lucero MD as Endoscopist     Problem List:     Patient Active Problem List   Diagnosis    Abnormal mammogram    Essential hypertension    Hypokalemia    Knee pain, left    Seborrheic keratosis    Vitamin D deficiency    Morbid obesity ([address] )    Osteoarthritis of right hip      Past Medical and Surgical History:     Past Medical History:   Diagnosis Date    Abnormal blood chemistry     Accelerated essential hypertension     Allergic rhinitis     Edema     Hypertension     Hypertension     Myalgia     Myositis      Past Surgical History:   Procedure Laterality Date    HYSTERECTOMY      VA COLONOSCOPY FLX DX W/COLLJ SPEC WHEN PFRMD N/A 2017    Procedure: COLONOSCOPY;  Surgeon: Aquiles Grace MD;  Location: [facility]; Service: Gastroenterology      Family History:     Family History   Problem Relation Age of Onset    No Known Problems Mother     Diabetes Family     Hypertension Family       Social History:     Social History     Socioeconomic History    Marital status:       Spouse name: None    Number of children: None    Years of education: None    Highest education level: None   Occupational History    None   Tobacco Use    Smoking status: Former Smoker     Quit date:      Years since quittin 2    Smokeless tobacco: Never Used   Vaping Use    Vaping Use: Never used   Substance and Sexual Activity    Alcohol use: Yes     Comment: occ    Drug use: No    Sexual activity: None   Other Topics Concern    None   Social History Narrative    No advance directives     Social Determinants of Health     Financial Resource Strain: Not on file   Food Insecurity: Not on file   Transportation Needs: Not on file   Physical Activity: Not on file   Stress: Not on file   Social Connections: Not on file   Intimate Partner Violence: Not on file   Housing Stability: Not on file      Medications and Allergies:     Current Outpatient Medications   Medication Sig Dispense Refill    Cholecalciferol (VITAMIN D3) 5000 units CAPS Take by mouth      hydrochlorothiazide (HYDRODIURIL) 25 mg tablet Take 1 tablet (25 mg total) by mouth daily 90 tablet 0    potassium chloride (K-DUR,KLOR-CON) 20 mEq tablet Take 1 tablet (20 mEq total) by mouth daily 30 tablet 0    losartan (COZAAR) 100 MG tablet Take 1 tablet (100 mg total) by mouth daily (Patient not taking: Reported on 3/30/2022 ) 90 tablet 0     No current facility-administered medications for this visit       No Known Allergies   Immunizations:     Immunization History   Administered Date(s) Administered    COVID-19 MODERNA VACC 0 5 ML IM 12/23/2021    COVID-19 PFIZER VACCINE 0 3 ML IM 04/15/2021, 05/10/2021    INFLUENZA 10/17/2017    Influenza Split High Dose Preservative Free IM 10/17/2017    Pneumococcal Conjugate 13-Valent 10/22/2015    Pneumococcal Polysaccharide PPV23 12/10/2016    Tdap 11/17/2014      Health Maintenance:         Topic Date Due    DXA SCAN  01/09/2022    Breast Cancer Screening: Mammogram  02/17/2022    Colorectal Cancer Screening  08/01/2027    Hepatitis C Screening  Completed         Topic Date Due    Influenza Vaccine (1) 09/01/2021      Medicare Health Risk Assessment:     /78 (BP Location: Left arm, Patient Position: Sitting, Cuff Size: Standard)   Pulse 63   Temp (!) 97 1 °F (36 2 °C) (Tympanic)   Resp 14   Ht 5' 2 3" (1 582 m)   Wt 115 kg (252 lb 12 8 oz)   SpO2 96%   BMI 45 79 kg/m²      Smita is here for her Subsequent Wellness visit  Health Risk Assessment:   Patient rates overall health as fair  Patient feels that their physical health rating is same  Patient is satisfied with their life  Eyesight was rated as same  Hearing was rated as same  Patient feels that their emotional and mental health rating is same  Patients states they are never, rarely angry  Patient states they are never, rarely unusually tired/fatigued  Pain experienced in the last 7 days has been some  Patient's pain rating has been 5/10  Patient states that she has experienced no weight loss or gain in last 6 months  Right hip     Depression Screening:   PHQ-2 Score: 2      Fall Risk Screening: In the past year, patient has experienced: no history of falling in past year      Urinary Incontinence Screening:   Patient has not leaked urine accidently in the last six months  Home Safety:  Patient has trouble with stairs inside or outside of their home  Patient has working smoke alarms and has working carbon monoxide detector  Home safety hazards include: none  Nutrition:   Current diet is Regular  Medications:   Patient is not currently taking any over-the-counter supplements  Patient is able to manage medications  Activities of Daily Living (ADLs)/Instrumental Activities of Daily Living (IADLs):   Walk and transfer into and out of bed and chair?: Yes  Dress and groom yourself?: Yes    Bathe or shower yourself?: Yes    Feed yourself?  Yes  Do your laundry/housekeeping?: Yes  Manage your money, pay your bills and track your expenses?: Yes  Make your own meals?: Yes    Do your own shopping?: Yes    Previous Hospitalizations:   Any hospitalizations or ED visits within the last 12 months?: No      Advance Care Planning:   Living will: No      Cognitive Screening:   Provider or family/friend/caregiver concerned regarding cognition?: No    PREVENTIVE SCREENINGS      Cardiovascular Screening:    General: Screening Current      Diabetes Screening:     General: Risks and Benefits Discussed      Colorectal Cancer Screening:     General: Screening Current      Breast Cancer Screening:     General: Screening Current      Cervical Cancer Screening:    General: Screening Not Indicated      Osteoporosis Screening:    General: Risks and Benefits Discussed      Abdominal Aortic Aneurysm (AAA) Screening:        General: Screening Not Indicated      Lung Cancer Screening:     General: Screening Not Indicated      Hepatitis C Screening:    General: Screening Current    Screening, Brief Intervention, and Referral to Treatment (SBIRT)    Screening  Typical number of drinks in a day: 0  Typical number of drinks in a week: 0  Interpretation: Low risk drinking behavior  Single Item Drug Screening:  How often have you used an illegal drug (including marijuana) or a prescription medication for non-medical reasons in the past year? never    Single Item Drug Screen Score: 0  Interpretation: Negative screen for possible drug use disorder    Other Counseling Topics:   Car/seat belt/driving safety, skin self-exam, sunscreen and calcium and vitamin D intake and regular weightbearing exercise         Patrice Coburn MD

## 2022-03-30 NOTE — PROGRESS NOTES
INTERNAL MEDICINE OFFICE VISIT  [facility] AT [address] [address]  Tel: [phone]      NAME: Ary Marin  AGE: 66 y o  SEX: female  : 1950   MRN: [id number]    DATE: 3/30/2022  TIME: 3:53 PM      Assessment and Plan:  1  Essential hypertension   patient did not want to take the losartan which was discontinued  She was started on amlodipine 5 mg daily and will continue the hydrochlorothiazide 25 mg daily  She will continue to monitor blood pressure regularly     - amLODIPine (NORVASC) 5 mg tablet; Take 1 tablet (5 mg total) by mouth daily  Dispense: 90 tablet; Refill: 3  - hydrochlorothiazide (HYDRODIURIL) 25 mg tablet; Take 1 tablet (25 mg total) by mouth daily  Dispense: 90 tablet; Refill: 3  - CBC and differential; Future  - Comprehensive metabolic panel; Future  - Lipid panel; Future  - TSH, 3rd generation; Future    2  Hypokalemia    - potassium chloride (K-DUR,KLOR-CON) 20 mEq tablet; Take 1 tablet (20 mEq total) by mouth daily  Dispense: 90 tablet; Refill: 3    3  Vitamin D deficiency    - Vitamin D 25 hydroxy; Future    4  Primary osteoarthritis of right hip    - Ambulatory Referral to Orthopedic Surgery; Future    5  Morbid obesity ([address] )  BMI Counseling: Body mass index is 45 79 kg/m²  The BMI is above normal  Nutrition recommendations include decreasing portion sizes, encouraging healthy choices of fruits and vegetables and moderation in carbohydrate intake  Exercise recommendations include moderate physical activity 150 minutes/week  Rationale for BMI follow-up plan is due to patient being overweight or obese  Depression Screening and Follow-up Plan: Patient was screened for depression during today's encounter   They screened negative with a PHQ-2 score of 2         6  Medicare annual wellness visit, subsequent        - Counseling Documentation: patient was counseled regarding: diagnostic results, instructions for management, risk factor reductions, prognosis, patient and family education, risks and benefits of treatment options and importance of compliance with treatment  - Medication Side Effects: Adverse side effects of medications were reviewed with the patient/guardian today  Return for follow up visit in Six months or earlier, if needed  Chief Complaint:  Chief Complaint   Patient presents with    Annual Exam         History of Present Illness:    blood pressure is high because she stopped taking the losartan as she said it was recalled   She has not had blood work for a year or more and I do not know her levels of the potassium and vitamin-D  She complains of increasing pain in the right hip and it is difficult for her to put weight on it  She has started using a cane to ambulate   She needs to lose a lot of weight      Active Problem List:  Patient Active Problem List   Diagnosis    Abnormal mammogram    Essential hypertension    Hypokalemia    Knee pain, left    Seborrheic keratosis    Vitamin D deficiency    Morbid obesity ([address] )    Osteoarthritis of right hip         Past Medical History:  Past Medical History:   Diagnosis Date    Abnormal blood chemistry     Accelerated essential hypertension     Allergic rhinitis     Edema     Hypertension     Hypertension     Myalgia     Myositis          Past Surgical History:  Past Surgical History:   Procedure Laterality Date    HYSTERECTOMY      RI COLONOSCOPY FLX DX W/COLLJ SPEC WHEN PFRMD N/A 8/1/2017    Procedure: COLONOSCOPY;  Surgeon: Marv Long MD;  Location: [facility]; Service: Gastroenterology         Family History:  Family History   Problem Relation Age of Onset    No Known Problems Mother     Diabetes Family     Hypertension Family          Social History:  Social History     Socioeconomic History    Marital status:       Spouse name: None    Number of children: None    Years of education: None    Highest education level: None   Occupational History    None   Tobacco Use    Smoking status: Former Smoker     Quit date:      Years since quittin 2    Smokeless tobacco: Never Used   Vaping Use    Vaping Use: Never used   Substance and Sexual Activity    Alcohol use: Yes     Comment: occ    Drug use: No    Sexual activity: None   Other Topics Concern    None   Social History Narrative    No advance directives     Social Determinants of Health     Financial Resource Strain: Not on file   Food Insecurity: Not on file   Transportation Needs: Not on file   Physical Activity: Not on file   Stress: Not on file   Social Connections: Not on file   Intimate Partner Violence: Not on file   Housing Stability: Not on file         Allergies:  No Known Allergies      Medications:    Current Outpatient Medications:     Cholecalciferol (VITAMIN D3) 5000 units CAPS, Take by mouth, Disp: , Rfl:     hydrochlorothiazide (HYDRODIURIL) 25 mg tablet, Take 1 tablet (25 mg total) by mouth daily, Disp: 90 tablet, Rfl: 3    potassium chloride (K-DUR,KLOR-CON) 20 mEq tablet, Take 1 tablet (20 mEq total) by mouth daily, Disp: 90 tablet, Rfl: 3    amLODIPine (NORVASC) 5 mg tablet, Take 1 tablet (5 mg total) by mouth daily, Disp: 90 tablet, Rfl: 3      The following portions of the patient's history were reviewed and updated as appropriate: past medical history, past surgical history, family history, social history, allergies, current medications and active problem list       Review of Systems:  Constitutional: Denies fever, chills, weight gain, weight loss, fatigue  Eyes: Denies eye redness, eye discharge, double vision, change in visual acuity  ENT: Denies hearing loss, tinnitus, sneezing, nasal congestion, nasal discharge, sore throat   Respiratory: Denies cough, expectoration, hemoptysis, shortness of breath, wheezing  Cardiovascular: Denies chest pain, palpitations, lower extremity swelling, orthopnea, PND  Gastrointestinal: Denies abdominal pain, heartburn, nausea, vomiting, hematemesis, diarrhea, bloody stools  Genito-Urinary: Denies dysuria, frequency, difficulty in micturition, nocturia, incontinence  Musculoskeletal: Denies back pain, has right hip joint pain, muscle pain  Neurologic: Denies confusion, lightheadedness, syncope, headache, focal weakness, sensory changes, seizures  Endocrine: Denies polyuria, polydipsia, temperature intolerance  Allergy and Immunology: Denies hives, insect bite sensitivity  Hematological and Lymphatic: Denies bleeding problems, swollen glands   Psychological: Denies depression, suicidal ideation, anxiety, panic, mood swings  Dermatological: Denies pruritus, rash, skin lesion changes      Vitals:  Vitals:    03/30/22 1530   BP: 150/78   Pulse: 63   Resp: 14   Temp: (!) 97 1 °F (36 2 °C)   SpO2: 96%       Body mass index is 45 79 kg/m²  Weight (last 2 days)     Date/Time Weight    03/30/22 1530 115 (252 8)            Physical Examination:  General: Patient is not in acute distress  Awake, alert, responding to commands  No weight gain or loss  Head: Normocephalic  Atraumatic  Eyes: Conjunctiva and lids with no swelling, erythema or discharge  Both pupils normal sized, round and reactive to light  Sclera nonicteric  ENT: External examination of nose and ear normal  Otoscopic examination shows translucent tympanic membranes with patent canals without erythema  Oropharynx moist with no erythema, edema, exudate or lesions  Neck: Supple  JVP not raised  Trachea midline  No masses  No thyromegaly  Lungs: No signs of increased work of breathing or respiratory distress  Bilateral bronchovascular breath sounds with no crackles or rhonchi  Chest wall: No tenderness  Cardiovascular: Normal PMI  No thrills  Regular rate and rhythm  S1 and S2 normal  No murmur, rub or gallop  Gastrointestinal: Abdomen soft, nontender  No guarding or rigidity  Liver and spleen not palpable   Bowel sounds present  Neurologic: Cranial nerves II-XII intact   Cortical functions normal  Motor system - Reflexes 2+ and symmetrical  Sensations normal  Musculoskeletal: Gait normal   Has right hip joint tenderness  Integumentary: Skin normal with no rash or lesions  Lymphatic: No palpable lymph nodes in neck, axilla or groin  Extremities: No clubbing, cyanosis, edema or varicosities  Psychological: Judgement and insight normal  Mood and affect normal      Laboratory Results:  CBC with diff:   Lab Results   Component Value Date    WBC 4 91 01/27/2020    WBC 4 27 (L) 09/30/2015    RBC 5 03 01/27/2020    RBC 4 98 09/30/2015    HGB 13 0 01/27/2020    HGB 12 9 09/30/2015    HCT 41 3 01/27/2020    HCT 40 1 09/30/2015    MCV 82 01/27/2020    MCV 81 (L) 09/30/2015    MCH 25 8 (L) 01/27/2020    MCH 25 9 (L) 09/30/2015    RDW 13 7 01/27/2020    RDW 14 3 09/30/2015     01/27/2020     09/30/2015       CMP:  Lab Results   Component Value Date    CREATININE 0 91 01/27/2020    CREATININE 0 88 09/30/2015    BUN 16 01/27/2020    BUN 14 09/30/2015     09/30/2015    K 3 4 (L) 01/27/2020    K 3 9 09/30/2015     01/27/2020     09/30/2015    CO2 31 01/27/2020    CO2 31 09/30/2015    GLUCOSE 93 09/30/2015    PROT 7 7 09/30/2015    ALKPHOS 95 01/27/2020    ALKPHOS 95 09/30/2015    ALT 20 01/27/2020    ALT 22 09/30/2015    AST 10 01/27/2020    AST 13 09/30/2015       Lab Results   Component Value Date    HGBA1C 4 7 04/28/2014       No results found for: TROPONINI, CKMB, CKTOTAL    Lipid Profile:   Lab Results   Component Value Date    CHOL 211 09/30/2015    CHOL 227 03/09/2015     Lab Results   Component Value Date    HDL 69 01/27/2020    HDL 63 (H) 09/20/2019     Lab Results   Component Value Date    LDLCALC 125 (H) 01/27/2020    LDLCALC 121 (H) 09/20/2019     Lab Results   Component Value Date    TRIG 81 01/27/2020    TRIG 83 09/20/2019       Imaging Results:  XR knee 3 vw left non injury  Narrative: LEFT KNEE    INDICATION:   [id number]: Pain in left knee  COMPARISON:  None    VIEWS:  XR KNEE 3 VW LEFT NON INJURY     FINDINGS:    There is no acute fracture or dislocation  There is no joint effusion  There is moderately severe tricompartmental osteoarthritis of the knee which is characterized mostly by the presence of osteophytic spurring and several intra-articular loose bodies which are visible in the upper joint recess and probably also in the   anterior aspect of the knee joint near the femorotibial articulation  There may also be a popliteal fossa intra-articular loose body  There is a small bone spur at the top of the patella  There is probably subcortical degenerative cyst formation in   the patella and perhaps in the femur  No lytic or blastic lesions are seen  Soft tissues are unremarkable  Impression: Tricompartmental osteoarthritis  Several large intra-articular loose bodies      Workstation performed: [id number]       Health Maintenance:  Health Maintenance   Topic Date Due    BMI: Followup Plan  02/04/2021    Influenza Vaccine (1) 09/01/2021    DXA SCAN  01/09/2022    Breast Cancer Screening: Mammogram  02/17/2022    Fall Risk  03/30/2023    Depression Screening  03/30/2023    Medicare Annual Wellness Visit (AWV)  03/30/2023    BMI: Adult  03/30/2023    DTaP,Tdap,and Td Vaccines (2 - Td or Tdap) 11/17/2024    Colorectal Cancer Screening  08/01/2027    Hepatitis C Screening  Completed    Osteoporosis Screening  Completed    Pneumococcal Vaccine: 65+ Years  Completed    COVID-19 Vaccine  Completed    HIB Vaccine  Aged Out    Hepatitis B Vaccine  Aged Out    IPV Vaccine  Aged Out    Hepatitis A Vaccine  Aged Out    Meningococcal ACWY Vaccine  Aged Out    HPV Vaccine  Aged Out     Immunization History   Administered Date(s) Administered    COVID-19 MODERNA VACC 0 5 ML IM 12/23/2021    COVID-19 PFIZER VACCINE 0 3 ML IM 04/15/2021, 05/10/2021    INFLUENZA 10/17/2017    Influenza Split High Dose Preservative Free IM 10/17/2017    Pneumococcal Conjugate 13-Valent 10/22/2015    Pneumococcal Polysaccharide PPV23 12/10/2016    Tdap 11/17/2014         Lawyer Dona MD  3/30/2022,3:53 PM

## 2022-03-30 NOTE — PATIENT INSTRUCTIONS
Medicare Preventive Visit Patient Instructions  Thank you for completing your Welcome to Medicare Visit or Medicare Annual Wellness Visit today  Your next wellness visit will be due in one year (3/31/2023)  The screening/preventive services that you may require over the next 5-10 years are detailed below  Some tests may not apply to you based off risk factors and/or age  Screening tests ordered at today's visit but not completed yet may show as past due  Also, please note that scanned in results may not display below  Preventive Screenings:  Service Recommendations Previous Testing/Comments   Colorectal Cancer Screening  * Colonoscopy    * Fecal Occult Blood Test (FOBT)/Fecal Immunochemical Test (FIT)  * Fecal DNA/Cologuard Test  * Flexible Sigmoidoscopy Age: 54-65 years old   Colonoscopy: every 10 years (may be performed more frequently if at higher risk)  OR  FOBT/FIT: every 1 year  OR  Cologuard: every 3 years  OR  Sigmoidoscopy: every 5 years  Screening may be recommended earlier than age 48 if at higher risk for colorectal cancer  Also, an individualized decision between you and your healthcare provider will decide whether screening between the ages of 74-80 would be appropriate  Colonoscopy: 08/01/2017  FOBT/FIT: Not on file  Cologuard: Not on file  Sigmoidoscopy: Not on file    Screening Current     Breast Cancer Screening Age: 36 years old  Frequency: every 1-2 years  Not required if history of left and right mastectomy Mammogram: 02/17/2021    Screening Current   Cervical Cancer Screening Between the ages of 21-29, pap smear recommended once every 3 years  Between the ages of 33-67, can perform pap smear with HPV co-testing every 5 years     Recommendations may differ for women with a history of total hysterectomy, cervical cancer, or abnormal pap smears in past  Pap Smear: Not on file    Screening Not Indicated   Hepatitis C Screening Once for adults born between 1945 and 1965  More frequently in patients at high risk for Hepatitis C Hep C Antibody: 09/20/2019    Screening Current   Diabetes Screening 1-2 times per year if you're at risk for diabetes or have pre-diabetes Fasting glucose: 97 mg/dL   A1C: No results in last 5 years        Cholesterol Screening Once every 5 years if you don't have a lipid disorder  May order more often based on risk factors  Lipid panel: 01/27/2020    Screening Current     Other Preventive Screenings Covered by Medicare:  1  Abdominal Aortic Aneurysm (AAA) Screening: covered once if your at risk  You're considered to be at risk if you have a family history of AAA  2  Lung Cancer Screening: covers low dose CT scan once per year if you meet all of the following conditions: (1) Age 50-69; (2) No signs or symptoms of lung cancer; (3) Current smoker or have quit smoking within the last 15 years; (4) You have a tobacco smoking history of at least 30 pack years (packs per day multiplied by number of years you smoked); (5) You get a written order from a healthcare provider  3  Glaucoma Screening: covered annually if you're considered high risk: (1) You have diabetes OR (2) Family history of glaucoma OR (3)  aged 48 and older OR (3)  American aged 72 and older  3  Osteoporosis Screening: covered every 2 years if you meet one of the following conditions: (1) You're estrogen deficient and at risk for osteoporosis based off medical history and other findings; (2) Have a vertebral abnormality; (3) On glucocorticoid therapy for more than 3 months; (4) Have primary hyperparathyroidism; (5) On osteoporosis medications and need to assess response to drug therapy  · Last bone density test (DXA Scan): 01/09/2020   5  HIV Screening: covered annually if you're between the age of 15-65  Also covered annually if you are younger than 13 and older than 72 with risk factors for HIV infection   For pregnant patients, it is covered up to 3 times per pregnancy  Immunizations:  Immunization Recommendations   Influenza Vaccine Annual influenza vaccination during flu season is recommended for all persons aged >= 6 months who do not have contraindications   Pneumococcal Vaccine (Prevnar and Pneumovax)  * Prevnar = PCV13  * Pneumovax = PPSV23   Adults 25-60 years old: 1-3 doses may be recommended based on certain risk factors  Adults 72 years old: Prevnar (PCV13) vaccine recommended followed by Pneumovax (PPSV23) vaccine  If already received PPSV23 since turning 65, then PCV13 recommended at least one year after PPSV23 dose  Hepatitis B Vaccine 3 dose series if at intermediate or high risk (ex: diabetes, end stage renal disease, liver disease)   Tetanus (Td) Vaccine - COST NOT COVERED BY MEDICARE PART B Following completion of primary series, a booster dose should be given every 10 years to maintain immunity against tetanus  Td may also be given as tetanus wound prophylaxis  Tdap Vaccine - COST NOT COVERED BY MEDICARE PART B Recommended at least once for all adults  For pregnant patients, recommended with each pregnancy  Shingles Vaccine (Shingrix) - COST NOT COVERED BY MEDICARE PART B  2 shot series recommended in those aged 48 and above     Health Maintenance Due:      Topic Date Due    DXA SCAN  01/09/2022    Breast Cancer Screening: Mammogram  02/17/2022    Colorectal Cancer Screening  08/01/2027    Hepatitis C Screening  Completed     Immunizations Due:      Topic Date Due    Influenza Vaccine (1) 09/01/2021     Advance Directives   What are advance directives? Advance directives are legal documents that state your wishes and plans for medical care  These plans are made ahead of time in case you lose your ability to make decisions for yourself  Advance directives can apply to any medical decision, such as the treatments you want, and if you want to donate organs  What are the types of advance directives?   There are many types of advance directives, and each state has rules about how to use them  You may choose a combination of any of the following:  · Living will: This is a written record of the treatment you want  You can also choose which treatments you do not want, which to limit, and which to stop at a certain time  This includes surgery, medicine, IV fluid, and tube feedings  · Durable power of  for healthcare [facility]): This is a written record that states who you want to make healthcare choices for you when you are unable to make them for yourself  This person, called a proxy, is usually a family member or a friend  You may choose more than 1 proxy  · Do not resuscitate (DNR) order:  A DNR order is used in case your heart stops beating or you stop breathing  It is a request not to have certain forms of treatment, such as CPR  A DNR order may be included in other types of advance directives  · Medical directive: This covers the care that you want if you are in a coma, near death, or unable to make decisions for yourself  You can list the treatments you want for each condition  Treatment may include pain medicine, surgery, blood transfusions, dialysis, IV or tube feedings, and a ventilator (breathing machine)  · Values history: This document has questions about your views, beliefs, and how you feel and think about life  This information can help others choose the care that you would choose  Why are advance directives important? An advance directive helps you control your care  Although spoken wishes may be used, it is better to have your wishes written down  Spoken wishes can be misunderstood, or not followed  Treatments may be given even if you do not want them  An advance directive may make it easier for your family to make difficult choices about your care     Weight Management   Why it is important to manage your weight:  Being overweight increases your risk of health conditions such as heart disease, high blood pressure, type 2 diabetes, and certain types of cancer  It can also increase your risk for osteoarthritis, sleep apnea, and other respiratory problems  Aim for a slow, steady weight loss  Even a small amount of weight loss can lower your risk of health problems  How to lose weight safely:  A safe and healthy way to lose weight is to eat fewer calories and get regular exercise  You can lose up about 1 pound a week by decreasing the number of calories you eat by 500 calories each day  Healthy meal plan for weight management:  A healthy meal plan includes a variety of foods, contains fewer calories, and helps you stay healthy  A healthy meal plan includes the following:  · Eat whole-grain foods more often  A healthy meal plan should contain fiber  Fiber is the part of grains, fruits, and vegetables that is not broken down by your body  Whole-grain foods are healthy and provide extra fiber in your diet  Some examples of whole-grain foods are whole-wheat breads and pastas, oatmeal, brown rice, and bulgur  · Eat a variety of vegetables every day  Include dark, leafy greens such as spinach, kale, nini greens, and mustard greens  Eat yellow and orange vegetables such as carrots, sweet potatoes, and winter squash  · Eat a variety of fruits every day  Choose fresh or canned fruit (canned in its own juice or light syrup) instead of juice  Fruit juice has very little or no fiber  · Eat low-fat dairy foods  Drink fat-free (skim) milk or 1% milk  Eat fat-free yogurt and low-fat cottage cheese  Try low-fat cheeses such as mozzarella and other reduced-fat cheeses  · Choose meat and other protein foods that are low in fat  Choose beans or other legumes such as split peas or lentils  Choose fish, skinless poultry (chicken or turkey), or lean cuts of red meat (beef or pork)  Before you cook meat or poultry, cut off any visible fat  · Use less fat and oil  Try baking foods instead of frying them   Add less fat, such as margarine, sour cream, regular salad dressing and mayonnaise to foods  Eat fewer high-fat foods  Some examples of high-fat foods include french fries, doughnuts, ice cream, and cakes  · Eat fewer sweets  Limit foods and drinks that are high in sugar  This includes candy, cookies, regular soda, and sweetened drinks  Exercise:  Exercise at least 30 minutes per day on most days of the week  Some examples of exercise include walking, biking, dancing, and swimming  You can also fit in more physical activity by taking the stairs instead of the elevator or parking farther away from stores  Ask your healthcare provider about the best exercise plan for you  © Copyright [employer] 2018 Information is for End User's use only and may not be sold, redistributed or otherwise used for commercial purposes   All illustrations and images included in CareNotes® are the copyrighted property of A D A M , Inc  or [address] [place] [address]

## 2022-03-31 NOTE — TELEPHONE ENCOUNTER
Upon review of the In Basket request we were able to locate, review, and update the patient chart as requested for DEXA Scan  Any additional questions or concerns should be emailed to the Practice Liaisons via [email or web address] email, please do not reply via In Basket      Thank you  Alpa Martin

## 2022-06-29 ENCOUNTER — TELEPHONE (OUTPATIENT)
Dept: GASTROENTEROLOGY | Facility: CLINIC | Age: 72
End: 2022-06-29

## 2022-10-31 ENCOUNTER — RA CDI HCC (OUTPATIENT)
Dept: OTHER | Facility: HOSPITAL | Age: 72
End: 2022-10-31

## 2022-11-04 ENCOUNTER — OFFICE VISIT (OUTPATIENT)
Dept: INTERNAL MEDICINE CLINIC | Facility: CLINIC | Age: 72
End: 2022-11-04

## 2022-11-04 ENCOUNTER — APPOINTMENT (OUTPATIENT)
Dept: LAB | Facility: CLINIC | Age: 72
End: 2022-11-04

## 2022-11-04 VITALS
OXYGEN SATURATION: 99 % | SYSTOLIC BLOOD PRESSURE: 144 MMHG | HEART RATE: 87 BPM | BODY MASS INDEX: 46.04 KG/M2 | TEMPERATURE: 97.8 F | DIASTOLIC BLOOD PRESSURE: 76 MMHG | WEIGHT: 250.2 LBS | RESPIRATION RATE: 18 BRPM | HEIGHT: 62 IN

## 2022-11-04 DIAGNOSIS — E55.9 VITAMIN D DEFICIENCY: ICD-10-CM

## 2022-11-04 DIAGNOSIS — Z78.0 ASYMPTOMATIC POSTMENOPAUSAL STATE: ICD-10-CM

## 2022-11-04 DIAGNOSIS — Z12.31 ENCOUNTER FOR SCREENING MAMMOGRAM FOR BREAST CANCER: ICD-10-CM

## 2022-11-04 DIAGNOSIS — M16.11 PRIMARY OSTEOARTHRITIS OF RIGHT HIP: ICD-10-CM

## 2022-11-04 DIAGNOSIS — I10 ESSENTIAL HYPERTENSION: ICD-10-CM

## 2022-11-04 DIAGNOSIS — I10 ESSENTIAL HYPERTENSION: Primary | ICD-10-CM

## 2022-11-04 DIAGNOSIS — Z23 ENCOUNTER FOR IMMUNIZATION: ICD-10-CM

## 2022-11-04 PROBLEM — M25.562 KNEE PAIN, LEFT: Status: RESOLVED | Noted: 2017-10-17 | Resolved: 2022-11-04

## 2022-11-04 RX ORDER — LISINOPRIL 10 MG/1
10 TABLET ORAL DAILY
Qty: 90 TABLET | Refills: 3 | Status: SHIPPED | OUTPATIENT
Start: 2022-11-04

## 2022-11-04 NOTE — PROGRESS NOTES
INTERNAL MEDICINE OFFICE VISIT  [facility] AT [address] [address]  Tel: [phone]      NAME: Armando Lomeli  AGE: 67 y o  SEX: female  : 1950   MRN: [id number]    DATE: 2022  TIME: 3:54 PM      Assessment and Plan:  1  Essential hypertension    As she has a lot of swelling in her ankles, she was told to discontinue the amlodipine and was started on lisinopril 10 mg daily  She will continue the hydrochlorothiazide 25 mg daily  2  Primary osteoarthritis of right hip    Was told to follow-up with orthopedics    3  Vitamin D deficiency   continue vitamin-D    4  Asymptomatic postmenopausal state    - DXA bone density spine hip and pelvis; Future    5  Encounter for screening mammogram for breast cancer    - Mammo screening bilateral w 3d & cad; Future    6  Encounter for immunization    - influenza vaccine, high-dose, PF 0 7 mL (FLUZONE HIGH-DOSE)      - Counseling Documentation: patient was counseled regarding: diagnostic results, instructions for management, risk factor reductions, prognosis, patient and family education, risks and benefits of treatment options and importance of compliance with treatment  - Medication Side Effects: Adverse side effects of medications were reviewed with the patient/guardian today  Return for follow up visit in  6 months or earlier, if needed        Chief Complaint:  Chief Complaint   Patient presents with   • Annual Exam         History of Present Illness:    blood pressure is stable on present medication but her ankles are swollen almost up to the knees because of the amlodipine mostly which was discontinued  She continues to have pain in her right hip and was given a referral for Orthopedics but did not make an appointment she has also not done blood work for a while      Active Problem List:  Patient Active Problem List   Diagnosis   • Abnormal mammogram   • Essential hypertension   • Hypokalemia   • Seborrheic keratosis   • Vitamin D deficiency   • Morbid obesity (HCC)   • Osteoarthritis of right hip         Past Medical History:  Past Medical History:   Diagnosis Date   • Abnormal blood chemistry    • Accelerated essential hypertension    • Allergic rhinitis    • Edema    • Hypertension    • Hypertension    • Knee pain, left 10/17/2017   • Myalgia    • Myositis          Past Surgical History:  Past Surgical History:   Procedure Laterality Date   • HYSTERECTOMY     • NH COLONOSCOPY FLX DX W/COLLJ SPEC WHEN PFRMD N/A 2017    Procedure: COLONOSCOPY;  Surgeon: Griselda Bollard, MD;  Location: [facility]; Service: Gastroenterology         Family History:  Family History   Problem Relation Age of Onset   • No Known Problems Mother    • Diabetes Family    • Hypertension Family          Social History:  Social History     Socioeconomic History   • Marital status:       Spouse name: None   • Number of children: None   • Years of education: None   • Highest education level: None   Occupational History   • None   Tobacco Use   • Smoking status: Former Smoker     Quit date:      Years since quittin 8   • Smokeless tobacco: Never Used   Vaping Use   • Vaping Use: Never used   Substance and Sexual Activity   • Alcohol use: Yes     Comment: occ   • Drug use: No   • Sexual activity: None   Other Topics Concern   • None   Social History Narrative    No advance directives     Social Determinants of Health     Financial Resource Strain: Not on file   Food Insecurity: Not on file   Transportation Needs: Not on file   Physical Activity: Not on file   Stress: Not on file   Social Connections: Not on file   Intimate Partner Violence: Not on file   Housing Stability: Not on file         Allergies:  No Known Allergies      Medications:    Current Outpatient Medications:   •  amLODIPine (NORVASC) 5 mg tablet, Take 1 tablet (5 mg total) by mouth daily, Disp: 90 tablet, Rfl: 3  •  Cholecalciferol (VITAMIN D3) 5000 units CAPS, Take by mouth, Disp: , Rfl:   •  hydrochlorothiazide (HYDRODIURIL) 25 mg tablet, Take 1 tablet (25 mg total) by mouth daily, Disp: 90 tablet, Rfl: 3  •  potassium chloride (K-DUR,KLOR-CON) 20 mEq tablet, Take 1 tablet (20 mEq total) by mouth daily, Disp: 90 tablet, Rfl: 3      The following portions of the patient's history were reviewed and updated as appropriate: past medical history, past surgical history, family history, social history, allergies, current medications and active problem list       Review of Systems:  Constitutional: Denies fever, chills, weight gain, weight loss, fatigue  Eyes: Denies eye redness, eye discharge, double vision, change in visual acuity  ENT: Denies hearing loss, tinnitus, sneezing, nasal congestion, nasal discharge, sore throat   Respiratory: Denies cough, expectoration, hemoptysis, shortness of breath, wheezing  Cardiovascular: Denies chest pain, palpitations, lower extremity swelling, orthopnea, PND  Gastrointestinal: Denies abdominal pain, heartburn, nausea, vomiting, hematemesis, diarrhea, bloody stools  Genito-Urinary: Denies dysuria, frequency, difficulty in micturition, nocturia, incontinence  Musculoskeletal: Denies back pain, joint pain, muscle pain  Neurologic: Denies confusion, lightheadedness, syncope, headache, focal weakness, sensory changes, seizures  Endocrine: Denies polyuria, polydipsia, temperature intolerance  Allergy and Immunology: Denies hives, insect bite sensitivity  Hematological and Lymphatic: Denies bleeding problems, swollen glands   Psychological: Denies depression, suicidal ideation, anxiety, panic, mood swings  Dermatological: Denies pruritus, rash, skin lesion changes      Vitals:  Vitals:    11/04/22 1532   BP: 144/76   Pulse: 87   Resp: 18   Temp: 97 8 °F (36 6 °C)   SpO2: 99%       Body mass index is 45 32 kg/m²      Weight (last 2 days)     Date/Time Weight    11/04/22 1532 113 (250 2)            Physical Examination:  General: Patient is not in acute distress  Awake, alert, responding to commands  No weight gain or loss  Head: Normocephalic  Atraumatic  Eyes: Conjunctiva and lids with no swelling, erythema or discharge  Both pupils normal sized, round and reactive to light  Sclera nonicteric  ENT: External examination of nose and ear normal  Otoscopic examination shows translucent tympanic membranes with patent canals without erythema  Oropharynx moist with no erythema, edema, exudate or lesions  Neck: Supple  JVP not raised  Trachea midline  No masses  No thyromegaly  Lungs: No signs of increased work of breathing or respiratory distress  Bilateral bronchovascular breath sounds with no crackles or rhonchi  Chest wall: No tenderness  Cardiovascular: Normal PMI  No thrills  Regular rate and rhythm  S1 and S2 normal  No murmur, rub or gallop  Gastrointestinal: Abdomen soft, nontender  No guarding or rigidity  Liver and spleen not palpable  Bowel sounds present  Neurologic: Cranial nerves II-XII intact   Cortical functions normal  Motor system - Reflexes 2+ and symmetrical  Sensations normal  Musculoskeletal: Gait normal  No joint tenderness  Integumentary: Skin normal with no rash or lesions  Lymphatic: No palpable lymph nodes in neck, axilla or groin  Extremities: No clubbing, cyanosis, edema or varicosities  Psychological: Judgement and insight normal  Mood and affect normal      Laboratory Results:  CBC with diff:   Lab Results   Component Value Date    WBC 4 91 01/27/2020    WBC 4 27 (L) 09/30/2015    RBC 5 03 01/27/2020    RBC 4 98 09/30/2015    HGB 13 0 01/27/2020    HGB 12 9 09/30/2015    HCT 41 3 01/27/2020    HCT 40 1 09/30/2015    MCV 82 01/27/2020    MCV 81 (L) 09/30/2015    MCH 25 8 (L) 01/27/2020    MCH 25 9 (L) 09/30/2015    RDW 13 7 01/27/2020    RDW 14 3 09/30/2015     01/27/2020     09/30/2015       CMP:  Lab Results   Component Value Date    CREATININE 0 91 01/27/2020    CREATININE 0 88 09/30/2015    BUN 16 01/27/2020    BUN 14 09/30/2015     09/30/2015    K 3 4 (L) 01/27/2020    K 3 9 09/30/2015     01/27/2020     09/30/2015    CO2 31 01/27/2020    CO2 31 09/30/2015    GLUCOSE 93 09/30/2015    PROT 7 7 09/30/2015    ALKPHOS 95 01/27/2020    ALKPHOS 95 09/30/2015    ALT 20 01/27/2020    ALT 22 09/30/2015    AST 10 01/27/2020    AST 13 09/30/2015       Lab Results   Component Value Date    HGBA1C 4 7 04/28/2014       No results found for: TROPONINI, CKMB, CKTOTAL    Lipid Profile:   Lab Results   Component Value Date    CHOL 211 09/30/2015    CHOL 227 03/09/2015     Lab Results   Component Value Date    HDL 69 01/27/2020    HDL 63 (H) 09/20/2019     Lab Results   Component Value Date    LDLCALC 125 (H) 01/27/2020    LDLCALC 121 (H) 09/20/2019     Lab Results   Component Value Date    TRIG 81 01/27/2020    TRIG 83 09/20/2019       Imaging Results:  Hm Dexa Scan  This Care Everywhere (CE) document can be found within the Chart Review: Encounters Tab  Linking this document to the order is not available at this time          Health Maintenance:  Health Maintenance   Topic Date Due   • DXA SCAN  01/09/2022   • Breast Cancer Screening: Mammogram  02/17/2022   • COVID-19 Vaccine (4 - Booster for Pfizer series) 04/23/2022   • Influenza Vaccine (1) 09/01/2022   • Depression Screening  03/30/2023   • BMI: Followup Plan  03/30/2023   • Fall Risk  03/30/2023   • Urinary Incontinence Screening  03/30/2023   • Medicare Annual Wellness Visit (AWV)  03/30/2023   • BMI: Adult  11/04/2023   • Colorectal Cancer Screening  08/01/2027   • Hepatitis C Screening  Completed   • Osteoporosis Screening  Completed   • Pneumococcal Vaccine: 65+ Years  Completed   • HIB Vaccine  Aged Out   • Hepatitis B Vaccine  Aged Out   • IPV Vaccine  Aged Out   • Hepatitis A Vaccine  Aged Out   • Meningococcal ACWY Vaccine  Aged Out   • HPV Vaccine  Aged Out     Immunization History   Administered Date(s) Administered   • COVID-19 MODERNA VACC 0 5 ML IM 12/23/2021   • COVID-19 PFIZER VACCINE 0 3 ML IM 04/15/2021, 05/10/2021   • INFLUENZA 10/17/2017   • Influenza Split High Dose Preservative Free IM 10/17/2017   • Pneumococcal Conjugate 13-Valent 10/22/2015   • Pneumococcal Polysaccharide PPV23 12/10/2016   • Tdap 11/17/2014         Shyann Vásquez MD  11/4/2022,3:54 PM

## 2022-11-05 LAB
25(OH)D3 SERPL-MCNC: 31.7 NG/ML (ref 30–100)
ALBUMIN SERPL BCP-MCNC: 4.1 G/DL (ref 3.5–5)
ALP SERPL-CCNC: 94 U/L (ref 46–116)
ALT SERPL W P-5'-P-CCNC: 22 U/L (ref 12–78)
ANION GAP SERPL CALCULATED.3IONS-SCNC: 4 MMOL/L (ref 4–13)
AST SERPL W P-5'-P-CCNC: 19 U/L (ref 5–45)
BASOPHILS # BLD AUTO: 0.04 THOUSANDS/ÂΜL (ref 0–0.1)
BASOPHILS NFR BLD AUTO: 1 % (ref 0–1)
BILIRUB SERPL-MCNC: 0.47 MG/DL (ref 0.2–1)
BUN SERPL-MCNC: 15 MG/DL (ref 5–25)
CALCIUM SERPL-MCNC: 10.2 MG/DL (ref 8.3–10.1)
CHLORIDE SERPL-SCNC: 104 MMOL/L (ref 96–108)
CHOLEST SERPL-MCNC: 212 MG/DL
CO2 SERPL-SCNC: 28 MMOL/L (ref 21–32)
CREAT SERPL-MCNC: 0.82 MG/DL (ref 0.6–1.3)
EOSINOPHIL # BLD AUTO: 0.09 THOUSAND/ÂΜL (ref 0–0.61)
EOSINOPHIL NFR BLD AUTO: 2 % (ref 0–6)
ERYTHROCYTE [DISTWIDTH] IN BLOOD BY AUTOMATED COUNT: 13.7 % (ref 11.6–15.1)
GFR SERPL CREATININE-BSD FRML MDRD: 71 ML/MIN/1.73SQ M
GLUCOSE P FAST SERPL-MCNC: 103 MG/DL (ref 65–99)
HCT VFR BLD AUTO: 41.4 % (ref 34.8–46.1)
HDLC SERPL-MCNC: 77 MG/DL
HGB BLD-MCNC: 13.1 G/DL (ref 11.5–15.4)
IMM GRANULOCYTES # BLD AUTO: 0.01 THOUSAND/UL (ref 0–0.2)
IMM GRANULOCYTES NFR BLD AUTO: 0 % (ref 0–2)
LDLC SERPL CALC-MCNC: 116 MG/DL (ref 0–100)
LYMPHOCYTES # BLD AUTO: 1.49 THOUSANDS/ÂΜL (ref 0.6–4.47)
LYMPHOCYTES NFR BLD AUTO: 30 % (ref 14–44)
MCH RBC QN AUTO: 25.8 PG (ref 26.8–34.3)
MCHC RBC AUTO-ENTMCNC: 31.6 G/DL (ref 31.4–37.4)
MCV RBC AUTO: 82 FL (ref 82–98)
MONOCYTES # BLD AUTO: 0.37 THOUSAND/ÂΜL (ref 0.17–1.22)
MONOCYTES NFR BLD AUTO: 7 % (ref 4–12)
NEUTROPHILS # BLD AUTO: 3.03 THOUSANDS/ÂΜL (ref 1.85–7.62)
NEUTS SEG NFR BLD AUTO: 60 % (ref 43–75)
NONHDLC SERPL-MCNC: 135 MG/DL
NRBC BLD AUTO-RTO: 0 /100 WBCS
PLATELET # BLD AUTO: 265 THOUSANDS/UL (ref 149–390)
PMV BLD AUTO: 11.2 FL (ref 8.9–12.7)
POTASSIUM SERPL-SCNC: 3.8 MMOL/L (ref 3.5–5.3)
PROT SERPL-MCNC: 8 G/DL (ref 6.4–8.4)
RBC # BLD AUTO: 5.07 MILLION/UL (ref 3.81–5.12)
SODIUM SERPL-SCNC: 136 MMOL/L (ref 135–147)
TRIGL SERPL-MCNC: 93 MG/DL
TSH SERPL DL<=0.05 MIU/L-ACNC: 0.93 UIU/ML (ref 0.45–4.5)
WBC # BLD AUTO: 5.03 THOUSAND/UL (ref 4.31–10.16)

## 2023-02-27 ENCOUNTER — TELEPHONE (OUTPATIENT)
Dept: INTERNAL MEDICINE CLINIC | Facility: CLINIC | Age: 73
End: 2023-02-27

## 2023-02-27 DIAGNOSIS — Z12.31 ENCOUNTER FOR SCREENING MAMMOGRAM FOR BREAST CANCER: ICD-10-CM

## 2023-02-27 NOTE — TELEPHONE ENCOUNTER
----- Message from Miguel Urbano DO sent at 2/27/2023  4:43 PM EST -----  Mammogram negative for malignancy

## 2023-04-02 DIAGNOSIS — E87.6 HYPOKALEMIA: ICD-10-CM

## 2023-04-02 DIAGNOSIS — I10 ESSENTIAL HYPERTENSION: ICD-10-CM

## 2023-04-04 RX ORDER — POTASSIUM CHLORIDE 1500 MG/1
TABLET, EXTENDED RELEASE ORAL
Qty: 90 TABLET | Refills: 3 | Status: SHIPPED | OUTPATIENT
Start: 2023-04-04

## 2023-04-04 RX ORDER — HYDROCHLOROTHIAZIDE 25 MG/1
TABLET ORAL
Qty: 90 TABLET | Refills: 3 | Status: SHIPPED | OUTPATIENT
Start: 2023-04-04

## 2023-05-05 ENCOUNTER — RA CDI HCC (OUTPATIENT)
Dept: OTHER | Facility: HOSPITAL | Age: 73
End: 2023-05-05

## 2023-05-11 ENCOUNTER — TELEPHONE (OUTPATIENT)
Dept: OTHER | Facility: OTHER | Age: 73
End: 2023-05-11

## 2023-08-03 ENCOUNTER — TELEPHONE (OUTPATIENT)
Age: 73
End: 2023-08-03

## 2023-08-03 NOTE — TELEPHONE ENCOUNTER
Left message to call back to schedule wellness visit with primary care provider or nurse practitioner.

## 2023-08-10 ENCOUNTER — TELEPHONE (OUTPATIENT)
Age: 73
End: 2023-08-10

## 2023-08-10 DIAGNOSIS — I10 ESSENTIAL HYPERTENSION: ICD-10-CM

## 2023-08-11 RX ORDER — LISINOPRIL 10 MG/1
10 TABLET ORAL DAILY
Qty: 90 TABLET | Refills: 3 | Status: SHIPPED | OUTPATIENT
Start: 2023-08-11

## 2024-03-11 ENCOUNTER — TELEPHONE (OUTPATIENT)
Age: 74
End: 2024-03-11

## 2024-03-21 ENCOUNTER — RA CDI HCC (OUTPATIENT)
Dept: OTHER | Facility: HOSPITAL | Age: 74
End: 2024-03-21

## 2024-03-27 ENCOUNTER — OFFICE VISIT (OUTPATIENT)
Age: 74
End: 2024-03-27
Payer: MEDICARE

## 2024-03-27 VITALS
BODY MASS INDEX: 40.9 KG/M2 | TEMPERATURE: 97.5 F | OXYGEN SATURATION: 98 % | SYSTOLIC BLOOD PRESSURE: 128 MMHG | HEART RATE: 85 BPM | DIASTOLIC BLOOD PRESSURE: 76 MMHG | RESPIRATION RATE: 18 BRPM | WEIGHT: 225.8 LBS

## 2024-03-27 DIAGNOSIS — Z78.0 ASYMPTOMATIC POSTMENOPAUSAL STATE: ICD-10-CM

## 2024-03-27 DIAGNOSIS — Z00.00 MEDICARE ANNUAL WELLNESS VISIT, SUBSEQUENT: ICD-10-CM

## 2024-03-27 DIAGNOSIS — I10 ESSENTIAL HYPERTENSION: Primary | ICD-10-CM

## 2024-03-27 DIAGNOSIS — E87.6 HYPOKALEMIA: ICD-10-CM

## 2024-03-27 DIAGNOSIS — M16.11 PRIMARY OSTEOARTHRITIS OF RIGHT HIP: ICD-10-CM

## 2024-03-27 DIAGNOSIS — E55.9 VITAMIN D DEFICIENCY: ICD-10-CM

## 2024-03-27 DIAGNOSIS — E66.01 MORBID OBESITY (HCC): ICD-10-CM

## 2024-03-27 PROCEDURE — G0439 PPPS, SUBSEQ VISIT: HCPCS | Performed by: INTERNAL MEDICINE

## 2024-03-27 PROCEDURE — 99214 OFFICE O/P EST MOD 30 MIN: CPT | Performed by: INTERNAL MEDICINE

## 2024-03-27 RX ORDER — HYDROCHLOROTHIAZIDE 12.5 MG/1
12.5 TABLET ORAL DAILY
Qty: 90 TABLET | Refills: 1 | Status: SHIPPED | OUTPATIENT
Start: 2024-03-27

## 2024-03-27 RX ORDER — LISINOPRIL 10 MG/1
10 TABLET ORAL DAILY
Qty: 90 TABLET | Refills: 1 | Status: SHIPPED | OUTPATIENT
Start: 2024-03-27

## 2024-03-27 RX ORDER — POTASSIUM CHLORIDE 750 MG/1
10 TABLET, EXTENDED RELEASE ORAL DAILY
Qty: 90 TABLET | Refills: 1 | Status: SHIPPED | OUTPATIENT
Start: 2024-03-27

## 2024-03-27 NOTE — PROGRESS NOTES
Assessment and Plan:   Blood pressure has been stable even though she has not been taking the lisinopril for a few months.  The lisinopril was sent to the pharmacy but I decreased the dose of the hydrochlorothiazide to 12.5 mg daily.  She was told to monitor her blood pressure regularly.  The dose of potassium was also decreased to 10 mill equivalents daily.  She was advised to continue taking the vitamin D and to get a DEXA scan done.  She was also told to try to lose weight    Problem List Items Addressed This Visit       Essential hypertension       Depression Screening and Follow-up Plan: Patient was screened for depression during today's encounter. They screened negative with a PHQ-2 score of 0.    Falls Plan of Care: balance, strength, and gait training instructions were provided.     Urinary Incontinence Plan of Care: counseling topics discussed: practice Kegel (pelvic floor strengthening) exercises, limit alcohol, caffeine, spicy foods, and acidic foods and limiting fluid intake 3-4 hours before bed.       Preventive health issues were discussed with patient, and age appropriate screening tests were ordered as noted in patient's After Visit Summary.  Personalized health advice and appropriate referrals for health education or preventive services given if needed, as noted in patient's After Visit Summary.     History of Present Illness:     Patient presents for a Medicare Wellness Visit    HPI  Patient is here for follow-up of hypertension, hypokalemia, vitamin D deficiency and obesity.  She complains of pain in her right hip but does not want to see the orthopedic doctor as yet.      Patient Care Team:  Mamie Monteiro MD as PCP - General  MD Jamin Siddiqi MD as Endoscopist     Review of Systems:     Review of Systems   Constitutional:  Negative for chills, diaphoresis, fatigue and fever.   HENT:  Negative for congestion, ear discharge, ear pain, hearing loss, postnasal drip, rhinorrhea,  sinus pressure, sinus pain, sneezing, sore throat and voice change.    Eyes:  Negative for pain, discharge, redness and visual disturbance.   Respiratory:  Negative for cough, chest tightness, shortness of breath and wheezing.    Cardiovascular:  Negative for chest pain, palpitations and leg swelling.   Gastrointestinal:  Negative for abdominal distention, abdominal pain, blood in stool, constipation, diarrhea, nausea and vomiting.   Endocrine: Negative for cold intolerance, heat intolerance, polydipsia, polyphagia and polyuria.   Genitourinary:  Negative for dysuria, flank pain, frequency, hematuria and urgency.   Musculoskeletal:  Positive for arthralgias, back pain and gait problem. Negative for joint swelling, myalgias, neck pain and neck stiffness.   Skin:  Negative for rash.   Neurological:  Negative for dizziness, tremors, syncope, facial asymmetry, speech difficulty, weakness, light-headedness, numbness and headaches.   Hematological:  Does not bruise/bleed easily.   Psychiatric/Behavioral:  Negative for behavioral problems, confusion and sleep disturbance. The patient is not nervous/anxious.         Problem List:     Patient Active Problem List   Diagnosis    Abnormal mammogram    Essential hypertension    Hypokalemia    Seborrheic keratosis    Vitamin D deficiency    Morbid obesity (HCC)    Osteoarthritis of right hip      Past Medical and Surgical History:     Past Medical History:   Diagnosis Date    Abnormal blood chemistry     Accelerated essential hypertension     Allergic rhinitis     Edema     Hypertension     Hypertension     Knee pain, left 10/17/2017    Myalgia     Myositis      Past Surgical History:   Procedure Laterality Date    HYSTERECTOMY      WY COLONOSCOPY FLX DX W/COLLJ SPEC WHEN PFRMD N/A 8/1/2017    Procedure: COLONOSCOPY;  Surgeon: Jamin Miranda MD;  Location: [facility];  Service: Gastroenterology      Family History:     Family History   Problem Relation Age of Onset    No Known  Problems Mother     Diabetes Family     Hypertension Family       Social History:     Social History     Socioeconomic History    Marital status:      Spouse name: None    Number of children: None    Years of education: None    Highest education level: None   Occupational History    None   Tobacco Use    Smoking status: Former     Current packs/day: 0.00     Types: Cigarettes     Quit date:      Years since quittin.2    Smokeless tobacco: Never   Vaping Use    Vaping status: Never Used   Substance and Sexual Activity    Alcohol use: Yes     Comment: occ    Drug use: No    Sexual activity: None   Other Topics Concern    None   Social History Narrative    No advance directives     Social Determinants of Health     Financial Resource Strain: Low Risk  (2023)    Overall Financial Resource Strain (CARDIA)     Difficulty of Paying Living Expenses: Not very hard   Food Insecurity: No Food Insecurity (3/20/2024)    Hunger Vital Sign     Worried About Running Out of Food in the Last Year: Never true     Ran Out of Food in the Last Year: Never true   Transportation Needs: No Transportation Needs (3/20/2024)    PRAPARE - Transportation     Lack of Transportation (Medical): No     Lack of Transportation (Non-Medical): No   Physical Activity: Inactive (2019)    Exercise Vital Sign     Days of Exercise per Week: 0 days     Minutes of Exercise per Session: 0 min   Stress: Not on file   Social Connections: Not on file   Intimate Partner Violence: Not on file   Housing Stability: Low Risk  (3/20/2024)    Housing Stability Vital Sign     Unable to Pay for Housing in the Last Year: No     Number of Places Lived in the Last Year: 0     Unstable Housing in the Last Year: No      Medications and Allergies:     Current Outpatient Medications   Medication Sig Dispense Refill    Cholecalciferol (VITAMIN D3) 5000 units CAPS Take by mouth      hydrochlorothiazide (HYDRODIURIL) 25 mg tablet TAKE 1 TABLET BY MOUTH  EVERY DAY 90 tablet 3    Klor-Con M20 20 MEQ tablet TAKE 1 TABLET BY MOUTH EVERY DAY 90 tablet 3    lisinopril (ZESTRIL) 10 mg tablet Take 1 tablet (10 mg total) by mouth daily (Patient not taking: Reported on 3/27/2024) 90 tablet 3     No current facility-administered medications for this visit.     No Known Allergies   Immunizations:     Immunization History   Administered Date(s) Administered    COVID-19 MODERNA VACC 0.5 ML IM 12/23/2021    COVID-19 PFIZER VACCINE 0.3 ML IM 04/15/2021, 05/10/2021    INFLUENZA 10/17/2017    Influenza Split High Dose Preservative Free IM 10/17/2017    Influenza, high dose seasonal 0.7 mL 11/04/2022    Pneumococcal Conjugate 13-Valent 10/22/2015    Pneumococcal Polysaccharide PPV23 12/10/2016    Tdap 11/17/2014      Health Maintenance:         Topic Date Due    DXA SCAN  01/09/2022    Breast Cancer Screening: Mammogram  03/08/2025    Colorectal Cancer Screening  08/01/2027    Hepatitis C Screening  Completed         Topic Date Due    Influenza Vaccine (1) 09/01/2023    COVID-19 Vaccine (4 - 2023-24 season) 09/01/2023      Medicare Screening Tests and Risk Assessments:     Sollette is here for her Subsequent Wellness visit.     Health Risk Assessment:   Patient rates overall health as fair. Patient feels that their physical health rating is same. Patient is very satisfied with their life. Eyesight was rated as slightly worse. Hearing was rated as same. Patient feels that their emotional and mental health rating is same. Patients states they are never, rarely angry. Patient states they are never, rarely unusually tired/fatigued. Pain experienced in the last 7 days has been some. Patient's pain rating has been 3/10. Patient states that she has experienced no weight loss or gain in last 6 months.     Depression Screening:   PHQ-2 Score: 0      Fall Risk Screening:   In the past year, patient has experienced: history of falling in past year    Number of falls: 1  Injured during fall?:  Yes    Feels unsteady when standing or walking?: Yes    Worried about falling?: Yes      Urinary Incontinence Screening:   Patient has leaked urine accidently in the last six months.     Home Safety:  Patient does not have trouble with stairs inside or outside of their home. Patient has working smoke alarms and has working carbon monoxide detector. Home safety hazards include: none.     Nutrition:   Current diet is No Added Salt.     Medications:   Patient is currently taking over-the-counter supplements. OTC medications include: see medication list. Patient is able to manage medications. aleve    Activities of Daily Living (ADLs)/Instrumental Activities of Daily Living (IADLs):   Walk and transfer into and out of bed and chair?: Yes  Dress and groom yourself?: Yes    Bathe or shower yourself?: Yes    Feed yourself? Yes  Do your laundry/housekeeping?: Yes  Manage your money, pay your bills and track your expenses?: Yes  Make your own meals?: Yes    Do your own shopping?: Yes    Previous Hospitalizations:   Any hospitalizations or ED visits within the last 12 months?: No      Advance Care Planning:   Living will: Yes    Advanced directive: Yes      PREVENTIVE SCREENINGS      Cardiovascular Screening:    General: Screening Current      Colorectal Cancer Screening:     General: Screening Current      Breast Cancer Screening:     General: Screening Current      Cervical Cancer Screening:    General: Screening Not Indicated      Lung Cancer Screening:     General: Screening Not Indicated      Hepatitis C Screening:    General: Screening Current    Screening, Brief Intervention, and Referral to Treatment (SBIRT)    Screening  Typical number of drinks in a day: 0  Typical number of drinks in a week: 0  Interpretation: Low risk drinking behavior.    Single Item Drug Screening:  How often have you used an illegal drug (including marijuana) or a prescription medication for non-medical reasons in the past year? never    Single  Item Drug Screen Score: 0  Interpretation: Negative screen for possible drug use disorder    No results found.     Physical Exam:     /76 (BP Location: Left arm, Patient Position: Sitting, Cuff Size: Large)   Pulse 85   Temp 97.5 °F (36.4 °C) (Temporal)   Resp 18   Wt 102 kg (225 lb 12.8 oz)   SpO2 98%   BMI 40.90 kg/m²     Physical Exam  Constitutional:       General: She is not in acute distress.     Appearance: She is well-developed. She is not diaphoretic.   HENT:      Head: Normocephalic and atraumatic.      Right Ear: External ear normal.      Left Ear: External ear normal.      Nose: Nose normal.   Eyes:      General: No scleral icterus.        Right eye: No discharge.         Left eye: No discharge.      Conjunctiva/sclera: Conjunctivae normal.   Cardiovascular:      Rate and Rhythm: Normal rate and regular rhythm.      Heart sounds: Normal heart sounds. No murmur heard.     No friction rub. No gallop.   Pulmonary:      Effort: Pulmonary effort is normal. No respiratory distress.      Breath sounds: Normal breath sounds. No wheezing or rales.   Abdominal:      General: Bowel sounds are normal. There is no distension.      Palpations: Abdomen is soft.      Tenderness: There is no abdominal tenderness. There is no guarding or rebound.   Musculoskeletal:         General: Tenderness present.   Skin:     General: Skin is warm and dry.      Findings: No erythema or rash.   Neurological:      Mental Status: She is alert and oriented to person, place, and time.      Cranial Nerves: No cranial nerve deficit.      Sensory: No sensory deficit.      Motor: No abnormal muscle tone.   Psychiatric:         Behavior: Behavior normal.          Mamie Monteiro MD

## 2024-06-28 NOTE — TELEPHONE ENCOUNTER
Porfirio Vázquez is a 61 year old male presenting with neuropathy.      ALLERGIES:   Allergen Reactions   • Bee Sting SWELLING        Denies Latex allergy or sensitivity.     Patient notes that he currently is not a smoker.    There were no vitals taken for this visit.    No changes to Patient's medical history or social history since their last visit.         Error.error

## 2024-09-26 DIAGNOSIS — E87.6 HYPOKALEMIA: ICD-10-CM

## 2024-09-26 DIAGNOSIS — I10 ESSENTIAL HYPERTENSION: ICD-10-CM

## 2024-10-04 DIAGNOSIS — I10 ESSENTIAL HYPERTENSION: ICD-10-CM

## 2024-10-04 DIAGNOSIS — E87.6 HYPOKALEMIA: ICD-10-CM

## 2024-10-04 NOTE — TELEPHONE ENCOUNTER
Medication: hydroCHLOROthiazide 12.5 mg tablet     Dose/Frequency: Take 1 tablet (12.5 mg total) by mouth daily     Quantity: 90 tablets    Pharmacy: [facility]/pharmacy [address] [place] [address]     Office:   [x] PCP/Provider -   [] Speciality/Provider -     Does the patient have enough for 3 days?   [x] Yes   [] No - Send as HP to POD        Medication: lisinopril (ZESTRIL) 10 mg tablet     Dose/Frequency: Take 1 tablet (10 mg total) by mouth daily     Quantity: 90 tablets      Pharmacy: [facility]/pharmacy [address] [place] [address]     Office:   [x] PCP/Provider -   [] Speciality/Provider -     Does the patient have enough for 3 days?   [x] Yes   [] No - Send as HP to POD      Medication: potassium chloride (Klor-Con M10) 10 mEq tablet     Dose/Frequency: Take 1 tablet (10 mEq total) by mouth daily     Quantity: 90 tablets      Pharmacy: [facility]/pharmacy [address] [place] [address]     Office:   [x] PCP/Provider -   [] Speciality/Provider -     Does the patient have enough for 3 days?   [x] Yes   [] No - Send as HP to POD

## 2024-10-07 RX ORDER — HYDROCHLOROTHIAZIDE 12.5 MG/1
12.5 TABLET ORAL DAILY
Qty: 90 TABLET | Refills: 1 | Status: SHIPPED | OUTPATIENT
Start: 2024-10-07

## 2024-10-07 RX ORDER — LISINOPRIL 10 MG/1
10 TABLET ORAL DAILY
Qty: 90 TABLET | Refills: 1 | Status: SHIPPED | OUTPATIENT
Start: 2024-10-07

## 2024-10-07 RX ORDER — POTASSIUM CHLORIDE 750 MG/1
10 TABLET, EXTENDED RELEASE ORAL DAILY
Qty: 90 TABLET | Refills: 1 | Status: SHIPPED | OUTPATIENT
Start: 2024-10-07

## 2024-10-14 RX ORDER — LISINOPRIL 10 MG/1
10 TABLET ORAL DAILY
Qty: 90 TABLET | Refills: 1 | Status: SHIPPED | OUTPATIENT
Start: 2024-10-14

## 2024-10-14 RX ORDER — POTASSIUM CHLORIDE 750 MG/1
10 TABLET, EXTENDED RELEASE ORAL DAILY
Qty: 90 TABLET | Refills: 1 | Status: SHIPPED | OUTPATIENT
Start: 2024-10-14

## 2024-10-14 RX ORDER — HYDROCHLOROTHIAZIDE 12.5 MG/1
12.5 TABLET ORAL DAILY
Qty: 90 TABLET | Refills: 1 | Status: SHIPPED | OUTPATIENT
Start: 2024-10-14

## 2024-10-18 ENCOUNTER — TELEPHONE (OUTPATIENT)
Age: 74
End: 2024-10-18

## 2024-10-18 NOTE — TELEPHONE ENCOUNTER
FYI: Pt stated she will no longer be a pt at [facility]. Going to [facility].

## 2025-05-10 DIAGNOSIS — E87.6 HYPOKALEMIA: ICD-10-CM

## 2025-05-10 DIAGNOSIS — I10 ESSENTIAL HYPERTENSION: ICD-10-CM

## 2025-05-13 NOTE — TELEPHONE ENCOUNTER
Left detailed VM to confirm if pt is still a pt of Dr. Monteiro's and to remind them of bw to complete and to make an appt.

## 2025-05-14 RX ORDER — LISINOPRIL 10 MG/1
10 TABLET ORAL DAILY
Qty: 30 TABLET | Refills: 0 | OUTPATIENT
Start: 2025-05-14

## 2025-05-14 RX ORDER — POTASSIUM CHLORIDE 750 MG/1
10 TABLET, EXTENDED RELEASE ORAL DAILY
Qty: 30 TABLET | Refills: 0 | OUTPATIENT
Start: 2025-05-14

## 2025-05-23 PROBLEM — E78.2 MIXED HYPERLIPIDEMIA: Status: ACTIVE | Noted: 2025-05-23

## 2025-05-28 ENCOUNTER — RA CDI HCC (OUTPATIENT)
Dept: OTHER | Facility: HOSPITAL | Age: 75
End: 2025-05-28

## 2025-06-03 ENCOUNTER — OFFICE VISIT (OUTPATIENT)
Age: 75
End: 2025-06-03
Payer: MEDICARE

## 2025-06-03 VITALS
OXYGEN SATURATION: 98 % | WEIGHT: 225 LBS | RESPIRATION RATE: 16 BRPM | BODY MASS INDEX: 41.41 KG/M2 | DIASTOLIC BLOOD PRESSURE: 74 MMHG | SYSTOLIC BLOOD PRESSURE: 124 MMHG | TEMPERATURE: 98.2 F | HEART RATE: 73 BPM | HEIGHT: 62 IN

## 2025-06-03 DIAGNOSIS — Z12.31 BREAST CANCER SCREENING BY MAMMOGRAM: ICD-10-CM

## 2025-06-03 DIAGNOSIS — I10 ESSENTIAL HYPERTENSION: ICD-10-CM

## 2025-06-03 DIAGNOSIS — E66.01 MORBID OBESITY (HCC): ICD-10-CM

## 2025-06-03 DIAGNOSIS — Z13.820 ENCOUNTER FOR OSTEOPOROSIS SCREENING IN ASYMPTOMATIC POSTMENOPAUSAL PATIENT: ICD-10-CM

## 2025-06-03 DIAGNOSIS — E55.9 VITAMIN D DEFICIENCY: ICD-10-CM

## 2025-06-03 DIAGNOSIS — T78.40XA ALLERGY, INITIAL ENCOUNTER: ICD-10-CM

## 2025-06-03 DIAGNOSIS — Z78.0 ENCOUNTER FOR OSTEOPOROSIS SCREENING IN ASYMPTOMATIC POSTMENOPAUSAL PATIENT: ICD-10-CM

## 2025-06-03 DIAGNOSIS — Z00.00 MEDICARE ANNUAL WELLNESS VISIT, SUBSEQUENT: Primary | ICD-10-CM

## 2025-06-03 DIAGNOSIS — E78.2 MIXED HYPERLIPIDEMIA: ICD-10-CM

## 2025-06-03 DIAGNOSIS — R73.01 IMPAIRED FASTING GLUCOSE: ICD-10-CM

## 2025-06-03 DIAGNOSIS — N39.3 STRESS INCONTINENCE IN FEMALE: ICD-10-CM

## 2025-06-03 DIAGNOSIS — M16.11 PRIMARY OSTEOARTHRITIS OF RIGHT HIP: ICD-10-CM

## 2025-06-03 DIAGNOSIS — Z76.89 ENCOUNTER TO ESTABLISH CARE: ICD-10-CM

## 2025-06-03 PROCEDURE — G0439 PPPS, SUBSEQ VISIT: HCPCS

## 2025-06-03 PROCEDURE — 99214 OFFICE O/P EST MOD 30 MIN: CPT

## 2025-06-03 PROCEDURE — G2211 COMPLEX E/M VISIT ADD ON: HCPCS

## 2025-06-03 RX ORDER — LORATADINE 10 MG/1
10 TABLET ORAL DAILY
Qty: 30 TABLET | Refills: 2 | Status: SHIPPED | OUTPATIENT
Start: 2025-06-03

## 2025-06-03 RX ORDER — FLUTICASONE PROPIONATE 50 MCG
1 SPRAY, SUSPENSION (ML) NASAL DAILY
Qty: 11.1 ML | Refills: 2 | Status: SHIPPED | OUTPATIENT
Start: 2025-06-03

## 2025-06-03 NOTE — PROGRESS NOTES
Name: Simta Gao      : 1950      MRN: [id number]  Encounter Provider: Kathy Regan PA-C  Encounter Date: 6/3/2025   Encounter department: [facility] [place]  :  Assessment & Plan  Medicare annual wellness visit, subsequent  - Discussed healthy diet, lifestyle, and screening recommendations with the patient. Appropriate orders placed.   - recent lab results reviewed and/or lab orders placed as appropriate for monitoring of the patient's chronic conditions or evaluation of acute complaints  - Discussed recommended vaccinations including zoster and RSV and recommend she get these from her local pharmacy.  Recommend she bring a copy of vaccination to our office if she decides to get them for our records.  -Recommend follow-up in 6 months and sooner as needed.       Encounter to establish care         Essential hypertension  Stable.  Continue lisinopril 10 mg daily and hydrochlorothiazide 12.5 mg daily.  Will check lab work for monitoring.  Patient is on potassium supplement 10 mEq daily, so will monitor potassium level and make adjustments to supplement as needed based on results.  Orders:  •  CBC and differential  •  Comprehensive metabolic panel    Primary osteoarthritis of right hip  Recommend follow-up with orthopedics, but patient declines at this time.  Patient may take Tylenol or Aleve as needed for pain.  Recommend avoiding regular use of NSAIDs like Aleve however because these can cause damage to the kidney, stomach, and increased risk of bleeding.       Stress incontinence in female  Continue use of pads as needed for incontinence.  Provided the patient with handout on Kegel exercises and recommend she try these to help strengthen the pelvic floor.  Recommend limiting fluid intake prior to bed and limiting caffeinated beverages.  Follow-up if symptoms worsen or fail to improve.         Mixed hyperlipidemia  Mild elevations in the past.  Recommend rechecking for monitoring.   Patient has an elevated ASCVD risk score of 18.6%, so if cholesterol still elevated, will likely recommend starting a statin.  In general, patient prefers not to take medication if possible.  Orders:  •  Lipid Panel with Direct LDL reflex    Morbid obesity (HCC)  Recommended diet low in carbohydrates, sugars, and fatty foods.  Recommend regular physical activity.         Vitamin D deficiency  Patient taking 5000 IU over-the-counter daily.  Recommend rechecking vitamin D for monitoring  Orders:  •  Vitamin D 25 hydroxy    Breast cancer screening by mammogram  Discussed recommended screening with the patient and appropriate order placed.    Orders:  •  Mammo screening bilateral w 3d and cad; Future    Encounter for osteoporosis screening in asymptomatic postmenopausal patient  DEXA few years ago reviewed and no evidence of osteoporosis at that time.  Recommend repeating for monitoring.  Patient agreeable.  Orders:  •  DXA bone density spine hip and pelvis; Future    Allergy, initial encounter  Suspect that the patient's cough is secondary to postnasal drip.  Recommend she start taking generic Claritin and Flonase daily to help reduce symptoms.  Follow-up if symptoms worsen or fail to improve.  Orders:  •  loratadine (CLARITIN) 10 mg tablet; Take 1 tablet (10 mg total) by mouth daily  •  fluticasone (FLONASE) 50 mcg/act nasal spray; 1 spray into each nostril daily    Impaired fasting glucose  History of elevated fasting blood sugar.  Recommend checking CMP and A1c for monitoring and for evaluation for any diabetes.  Patient agreeable.  Orders:  •  Hemoglobin A1C; Future      Depression Screening and Follow-up Plan: Patient was screened for depression during today's encounter. They screened negative with a PHQ-2 score of 1.      Urinary Incontinence Plan of Care: counseling topics discussed: practice Kegel (pelvic floor strengthening) exercises, limit alcohol, caffeine, spicy foods, and acidic foods and limiting fluid  intake 3-4 hours before bed.       Preventive health issues were discussed with patient, and age appropriate screening tests were ordered as noted in patient's After Visit Summary. Personalized health advice and appropriate referrals for health education or preventive services given if needed, as noted in patient's After Visit Summary.    History of Present Illness     Smita Gao presents the office for Medicare annual wellness visit and to establish care.  She has arthritis in her right hip which causes her pain, but she has not seen an orthopedist for this yet.  She has a slight fear of getting injections for the pain because she had an injection in her knee in the past and felt that the pain worsened afterwards.  She takes Aleve about once a day for the past few months which helps alleviate her pain.  She feels that Aleve works better than Tylenol.  She wears glasses (bifocals), but does not currently have an eye doctor.  No current dentist.  She walks daily, but no formal exercise.  She is considering starting to use an anjel exercise plan for individuals over 50.  She works in her yard frequently.  She states she has a good well-balanced diet, but does admit to having a sweet tooth and enjoying things like ice cream.  But she tries to balance this out with fruits and vegetables.  She eats meat including steak, chicken, fish.  She complains of dry cough that is worse at night.  Tends to be worse in the springtime.  She has a generic allergy medication but has not taken it yet this year.  Medicare questions indicate evidence of incontinence.  Patient states this is infrequent and tends to only occur when she coughs, sneezes, or laughs.  She will wear padded underwear occasionally for urinary leakage.       Patient Care Team:  Kathy Regan PA-C as PCP - General (Family Medicine)  MD Jamin Siddiqi MD as Endoscopist    Review of Systems   Constitutional:  Negative for chills and fever.    HENT:  Negative for congestion, ear pain and sore throat.    Eyes:  Negative for pain and visual disturbance.   Respiratory:  Negative for cough and shortness of breath.    Cardiovascular:  Negative for chest pain and palpitations.   Gastrointestinal:  Negative for abdominal pain, constipation, diarrhea and vomiting.   Genitourinary:  Negative for dysuria and hematuria.   Musculoskeletal:  Positive for arthralgias (Right hip pain). Negative for back pain and myalgias.   Skin:  Negative for color change and rash.   Neurological:  Negative for dizziness, seizures, syncope and headaches.   All other systems reviewed and are negative.    Medical History Reviewed by provider this encounter:  Tobacco  Allergies  Meds  Problems  Med Hx  Surg Hx  Fam Hx       Annual Wellness Visit Questionnaire   Sollette is here for her Subsequent Wellness visit.     Health Risk Assessment:   Patient rates overall health as fair. Patient feels that their physical health rating is same. Patient is very satisfied with their life. Eyesight was rated as slightly worse. Hearing was rated as same. Patient feels that their emotional and mental health rating is same. Patients states they are never, rarely angry. Patient states they are never, rarely unusually tired/fatigued. Pain experienced in the last 7 days has been some. Patient's pain rating has been 3/10. Patient states that she has experienced no weight loss or gain in last 6 months.     Depression Screening:   PHQ-2 Score: 1      Fall Risk Screening:   In the past year, patient has experienced: no history of falling in past year      Urinary Incontinence Screening:   Patient has leaked urine accidently in the last six months.     Home Safety:  Patient does not have trouble with stairs inside or outside of their home. Patient has working smoke alarms and has working carbon monoxide detector. Home safety hazards include: none.     Nutrition:   Current diet is No Added Salt.      Medications:   Patient is currently taking over-the-counter supplements. OTC medications include: see medication list. Patient is able to manage medications. aleve    Activities of Daily Living (ADLs)/Instrumental Activities of Daily Living (IADLs):   Walk and transfer into and out of bed and chair?: Yes  Dress and groom yourself?: Yes    Bathe or shower yourself?: Yes    Feed yourself? Yes  Do your laundry/housekeeping?: Yes  Manage your money, pay your bills and track your expenses?: Yes  Make your own meals?: Yes    Do your own shopping?: Yes    Previous Hospitalizations:   Any hospitalizations or ED visits within the last 12 months?: No      Advance Care Planning:   Living will: Yes    Advanced directive: Yes      Preventive Screenings      Cardiovascular Screening:    General: Screening Current      Colorectal Cancer Screening:     General: Screening Current      Breast Cancer Screening:     General: Screening Current      Cervical Cancer Screening:    General: Screening Not Indicated      Lung Cancer Screening:     General: Screening Not Indicated      Hepatitis C Screening:    General: Screening Current    Immunizations:  - Immunizations due: Zoster (Shingrix)    Screening, Brief Intervention, and Referral to Treatment (SBIRT)     Screening  Typical number of drinks in a day: 0  Typical number of drinks in a week: 0  Interpretation: Low risk drinking behavior.    Single Item Drug Screening:  How often have you used an illegal drug (including marijuana) or a prescription medication for non-medical reasons in the past year? never    Single Item Drug Screen Score: 0  Interpretation: Negative screen for possible drug use disorder    Social Drivers of Health     Food Insecurity: No Food Insecurity (6/3/2025)    Nursing - Inadequate Food Risk Classification    • Worried About Running Out of Food in the Last Year: Never true    • Ran Out of Food in the Last Year: Never true   Transportation Needs: No  "Transportation Needs (6/3/2025)    PRAPARE - Transportation    • Lack of Transportation (Medical): No    • Lack of Transportation (Non-Medical): No   Housing Stability: Low Risk  (6/3/2025)    Housing Stability Vital Sign    • Unable to Pay for Housing in the Last Year: No    • Number of Times Moved in the Last Year: 0    • Homeless in the Last Year: No   Utilities: Not At Risk (6/3/2025)    [facility] Utilities    • Threatened with loss of utilities: No     No results found.    Objective   /74 (BP Location: Right arm, Patient Position: Sitting, Cuff Size: Large)   Pulse 73   Temp 98.2 °F (36.8 °C) (Tympanic Core)   Resp 16   Ht 5' 2.3\" (1.582 m)   Wt 102 kg (225 lb)   SpO2 98%   BMI 40.76 kg/m²     Physical Exam  Vitals and nursing note reviewed.   Constitutional:       General: She is not in acute distress.     Appearance: She is well-developed.   HENT:      Head: Normocephalic and atraumatic.      Right Ear: Tympanic membrane normal. There is no impacted cerumen.      Left Ear: Tympanic membrane normal. There is no impacted cerumen.      Nose: Nose normal. No congestion.      Mouth/Throat:      Mouth: Mucous membranes are moist.      Pharynx: Oropharynx is clear. No oropharyngeal exudate or posterior oropharyngeal erythema.     Eyes:      Extraocular Movements: Extraocular movements intact.      Conjunctiva/sclera: Conjunctivae normal.       Cardiovascular:      Rate and Rhythm: Normal rate and regular rhythm.      Heart sounds: Normal heart sounds. No murmur heard.     No friction rub. No gallop.   Pulmonary:      Effort: Pulmonary effort is normal. No respiratory distress.      Breath sounds: Normal breath sounds. No wheezing, rhonchi or rales.     Musculoskeletal:         General: No swelling.      Cervical back: Neck supple.     Skin:     General: Skin is warm and dry.      Capillary Refill: Capillary refill takes less than 2 seconds.     Neurological:      General: No focal deficit present.      Mental " Status: She is alert.     Psychiatric:         Mood and Affect: Mood normal.

## 2025-06-03 NOTE — ASSESSMENT & PLAN NOTE
Suspect that the patient's cough is secondary to postnasal drip.  Recommend she start taking generic Claritin and Flonase daily to help reduce symptoms.  Follow-up if symptoms worsen or fail to improve.  Orders:  •  loratadine (CLARITIN) 10 mg tablet; Take 1 tablet (10 mg total) by mouth daily  •  fluticasone (FLONASE) 50 mcg/act nasal spray; 1 spray into each nostril daily

## 2025-06-03 NOTE — ASSESSMENT & PLAN NOTE
Recommend follow-up with orthopedics, but patient declines at this time.  Patient may take Tylenol or Aleve as needed for pain.  Recommend avoiding regular use of NSAIDs like Aleve however because these can cause damage to the kidney, stomach, and increased risk of bleeding.

## 2025-06-03 NOTE — ASSESSMENT & PLAN NOTE
History of elevated fasting blood sugar.  Recommend checking CMP and A1c for monitoring and for evaluation for any diabetes.  Patient agreeable.  Orders:  •  Hemoglobin A1C; Future

## 2025-06-03 NOTE — ASSESSMENT & PLAN NOTE
Mild elevations in the past.  Recommend rechecking for monitoring.  Patient has an elevated ASCVD risk score of 18.6%, so if cholesterol still elevated, will likely recommend starting a statin.  In general, patient prefers not to take medication if possible.  Orders:  •  Lipid Panel with Direct LDL reflex

## 2025-06-03 NOTE — ASSESSMENT & PLAN NOTE
Continue use of pads as needed for incontinence.  Provided the patient with handout on Kegel exercises and recommend she try these to help strengthen the pelvic floor.  Recommend limiting fluid intake prior to bed and limiting caffeinated beverages.  Follow-up if symptoms worsen or fail to improve.

## 2025-06-03 NOTE — ASSESSMENT & PLAN NOTE
Recommended diet low in carbohydrates, sugars, and fatty foods.  Recommend regular physical activity.

## 2025-06-03 NOTE — ASSESSMENT & PLAN NOTE
Stable.  Continue lisinopril 10 mg daily and hydrochlorothiazide 12.5 mg daily.  Will check lab work for monitoring.  Patient is on potassium supplement 10 mEq daily, so will monitor potassium level and make adjustments to supplement as needed based on results.  Orders:  •  CBC and differential  •  Comprehensive metabolic panel

## 2025-06-03 NOTE — ASSESSMENT & PLAN NOTE
Patient taking 5000 IU over-the-counter daily.  Recommend rechecking vitamin D for monitoring  Orders:  •  Vitamin D 25 hydroxy

## 2025-06-27 DIAGNOSIS — T78.40XA ALLERGY, INITIAL ENCOUNTER: ICD-10-CM

## 2025-06-27 RX ORDER — LORATADINE 10 MG/1
10 TABLET ORAL DAILY
Qty: 90 TABLET | Refills: 1 | Status: SHIPPED | OUTPATIENT
Start: 2025-06-27

## 2025-07-27 DIAGNOSIS — T78.40XA ALLERGY, INITIAL ENCOUNTER: ICD-10-CM

## 2025-07-29 RX ORDER — FLUTICASONE PROPIONATE 50 MCG
SPRAY, SUSPENSION (ML) NASAL
Qty: 24 ML | Refills: 1 | Status: SHIPPED | OUTPATIENT
Start: 2025-07-29